# Patient Record
Sex: FEMALE | Race: ASIAN | NOT HISPANIC OR LATINO
[De-identification: names, ages, dates, MRNs, and addresses within clinical notes are randomized per-mention and may not be internally consistent; named-entity substitution may affect disease eponyms.]

---

## 2021-11-03 ENCOUNTER — NON-APPOINTMENT (OUTPATIENT)
Age: 26
End: 2021-11-03

## 2021-11-03 ENCOUNTER — APPOINTMENT (OUTPATIENT)
Dept: OBGYN | Facility: HOSPITAL | Age: 26
End: 2021-11-03

## 2021-11-03 ENCOUNTER — OUTPATIENT (OUTPATIENT)
Dept: OUTPATIENT SERVICES | Facility: HOSPITAL | Age: 26
LOS: 1 days | End: 2021-11-03
Payer: MEDICAID

## 2021-11-03 VITALS
HEIGHT: 65 IN | BODY MASS INDEX: 42.15 KG/M2 | WEIGHT: 253 LBS | HEART RATE: 90 BPM | DIASTOLIC BLOOD PRESSURE: 80 MMHG | SYSTOLIC BLOOD PRESSURE: 130 MMHG

## 2021-11-03 DIAGNOSIS — Z34.90 ENCOUNTER FOR SUPERVISION OF NORMAL PREGNANCY, UNSPECIFIED, UNSPECIFIED TRIMESTER: ICD-10-CM

## 2021-11-03 DIAGNOSIS — Z83.3 FAMILY HISTORY OF DIABETES MELLITUS: ICD-10-CM

## 2021-11-03 DIAGNOSIS — E66.9 OBESITY, UNSPECIFIED: ICD-10-CM

## 2021-11-03 DIAGNOSIS — Z87.42 PERSONAL HISTORY OF OTHER DISEASES OF THE FEMALE GENITAL TRACT: ICD-10-CM

## 2021-11-03 DIAGNOSIS — Z00.00 ENCOUNTER FOR GENERAL ADULT MEDICAL EXAMINATION W/OUT ABNORMAL FINDINGS: ICD-10-CM

## 2021-11-03 DIAGNOSIS — Z00.00 ENCOUNTER FOR GENERAL ADULT MEDICAL EXAMINATION WITHOUT ABNORMAL FINDINGS: ICD-10-CM

## 2021-11-03 DIAGNOSIS — Z34.91 ENCOUNTER FOR SUPERVISION OF NORMAL PREGNANCY, UNSPECIFIED, FIRST TRIMESTER: ICD-10-CM

## 2021-11-03 RX ORDER — VITAMIN A ACETATE, .BETA.-CAROTENE, ASCORBIC ACID, CHOLECALCIFEROL, .ALPHA.-TOCOPHEROL ACETATE, DL-, THIAMINE MONONITRATE, RIBOFLAVIN, NIACINAMIDE, PYRIDOXINE HYDROCHLORIDE, FOLIC ACID, CYANOCOBALAMIN, CALCIUM CARBONATE, FERROUS FUMARATE, ZINC OXIDE, AND CUPRIC OXIDE 2000; 2000; 120; 400; 22; 1.84; 3; 20; 10; 1; 12; 200; 27; 25; 2 [IU]/1; [IU]/1; MG/1; [IU]/1; MG/1; MG/1; MG/1; MG/1; MG/1; MG/1; UG/1; MG/1; MG/1; MG/1; MG/1
TABLET ORAL
Refills: 0 | Status: ACTIVE | COMMUNITY

## 2021-11-08 ENCOUNTER — RESULT REVIEW (OUTPATIENT)
Age: 26
End: 2021-11-08

## 2021-11-08 ENCOUNTER — ASOB RESULT (OUTPATIENT)
Age: 26
End: 2021-11-08

## 2021-11-08 ENCOUNTER — APPOINTMENT (OUTPATIENT)
Dept: ANTEPARTUM | Facility: CLINIC | Age: 26
End: 2021-11-08
Payer: MEDICAID

## 2021-11-08 LAB
24R-OH-CALCIDIOL SERPL-MCNC: 15.2 NG/ML — LOW (ref 30–80)
A1C WITH ESTIMATED AVERAGE GLUCOSE RESULT: 5.4 % — SIGNIFICANT CHANGE UP (ref 4–5.6)
ALBUMIN SERPL ELPH-MCNC: 4.3 G/DL — SIGNIFICANT CHANGE UP (ref 3.3–5)
ALP SERPL-CCNC: 80 U/L — SIGNIFICANT CHANGE UP (ref 40–120)
ALT FLD-CCNC: 19 U/L — SIGNIFICANT CHANGE UP (ref 4–33)
ANION GAP SERPL CALC-SCNC: 10 MMOL/L — SIGNIFICANT CHANGE UP (ref 7–14)
AST SERPL-CCNC: 17 U/L — SIGNIFICANT CHANGE UP (ref 4–32)
BASOPHILS # BLD AUTO: 0.05 K/UL — SIGNIFICANT CHANGE UP (ref 0–0.2)
BASOPHILS NFR BLD AUTO: 0.7 % — SIGNIFICANT CHANGE UP (ref 0–2)
BILIRUB SERPL-MCNC: 0.2 MG/DL — SIGNIFICANT CHANGE UP (ref 0.2–1.2)
BUN SERPL-MCNC: 6 MG/DL — LOW (ref 7–23)
CALCIUM SERPL-MCNC: 9.6 MG/DL — SIGNIFICANT CHANGE UP (ref 8.4–10.5)
CHLORIDE SERPL-SCNC: 104 MMOL/L — SIGNIFICANT CHANGE UP (ref 98–107)
CO2 SERPL-SCNC: 23 MMOL/L — SIGNIFICANT CHANGE UP (ref 22–31)
CREAT SERPL-MCNC: 0.49 MG/DL — LOW (ref 0.5–1.3)
EOSINOPHIL # BLD AUTO: 0.14 K/UL — SIGNIFICANT CHANGE UP (ref 0–0.5)
EOSINOPHIL NFR BLD AUTO: 1.8 % — SIGNIFICANT CHANGE UP (ref 0–6)
ESTIMATED AVERAGE GLUCOSE: 108 — SIGNIFICANT CHANGE UP
GLUCOSE SERPL-MCNC: 119 MG/DL — HIGH (ref 70–99)
HBV SURFACE AG SER-ACNC: SIGNIFICANT CHANGE UP
HCT VFR BLD CALC: 36.1 % — SIGNIFICANT CHANGE UP (ref 34.5–45)
HCV AB S/CO SERPL IA: 0.23 S/CO — SIGNIFICANT CHANGE UP (ref 0–0.99)
HCV AB SERPL-IMP: SIGNIFICANT CHANGE UP
HEMOGLOBIN INTERPRETATION: SIGNIFICANT CHANGE UP
HGB A MFR BLD: 97.3 % — SIGNIFICANT CHANGE UP
HGB A2 MFR BLD: 2.6 % — SIGNIFICANT CHANGE UP (ref 2.4–3.5)
HGB BLD-MCNC: 10.6 G/DL — LOW (ref 11.5–15.5)
HGB F MFR BLD: <1 % — SIGNIFICANT CHANGE UP (ref 0–1.5)
HIV 1+2 AB+HIV1 P24 AG SERPL QL IA: SIGNIFICANT CHANGE UP
IANC: 4.96 K/UL — SIGNIFICANT CHANGE UP (ref 1.5–8.5)
IMM GRANULOCYTES NFR BLD AUTO: 0.1 % — SIGNIFICANT CHANGE UP (ref 0–1.5)
LYMPHOCYTES # BLD AUTO: 1.89 K/UL — SIGNIFICANT CHANGE UP (ref 1–3.3)
LYMPHOCYTES # BLD AUTO: 24.9 % — SIGNIFICANT CHANGE UP (ref 13–44)
MCHC RBC-ENTMCNC: 20.8 PG — LOW (ref 27–34)
MCHC RBC-ENTMCNC: 29.4 GM/DL — LOW (ref 32–36)
MCV RBC AUTO: 70.8 FL — LOW (ref 80–100)
MEV IGG SER-ACNC: >300 AU/ML — SIGNIFICANT CHANGE UP
MEV IGG+IGM SER-IMP: POSITIVE — SIGNIFICANT CHANGE UP
MONOCYTES # BLD AUTO: 0.55 K/UL — SIGNIFICANT CHANGE UP (ref 0–0.9)
MONOCYTES NFR BLD AUTO: 7.2 % — SIGNIFICANT CHANGE UP (ref 2–14)
NEUTROPHILS # BLD AUTO: 4.96 K/UL — SIGNIFICANT CHANGE UP (ref 1.8–7.4)
NEUTROPHILS NFR BLD AUTO: 65.3 % — SIGNIFICANT CHANGE UP (ref 43–77)
NRBC # BLD: 0 /100 WBCS — SIGNIFICANT CHANGE UP
NRBC # FLD: 0 K/UL — SIGNIFICANT CHANGE UP
PLATELET # BLD AUTO: 422 K/UL — HIGH (ref 150–400)
POTASSIUM SERPL-MCNC: 3.5 MMOL/L — SIGNIFICANT CHANGE UP (ref 3.5–5.3)
POTASSIUM SERPL-SCNC: 3.5 MMOL/L — SIGNIFICANT CHANGE UP (ref 3.5–5.3)
PROT SERPL-MCNC: 7.6 G/DL — SIGNIFICANT CHANGE UP (ref 6–8.3)
RBC # BLD: 5.1 M/UL — SIGNIFICANT CHANGE UP (ref 3.8–5.2)
RBC # FLD: 18 % — HIGH (ref 10.3–14.5)
RUBV IGG SER-ACNC: 2.5 INDEX — SIGNIFICANT CHANGE UP
RUBV IGG SER-IMP: POSITIVE — SIGNIFICANT CHANGE UP
SODIUM SERPL-SCNC: 137 MMOL/L — SIGNIFICANT CHANGE UP (ref 135–145)
T PALLIDUM AB TITR SER: NEGATIVE — SIGNIFICANT CHANGE UP
T4 FREE SERPL-MCNC: 1.2 NG/DL — SIGNIFICANT CHANGE UP (ref 0.9–1.8)
TSH SERPL-MCNC: 1.61 UIU/ML — SIGNIFICANT CHANGE UP (ref 0.27–4.2)
URATE SERPL-MCNC: 3.8 MG/DL — SIGNIFICANT CHANGE UP (ref 2.5–7)
VZV IGG FLD QL IA: 34.6 INDEX — SIGNIFICANT CHANGE UP
VZV IGG FLD QL IA: NEGATIVE — SIGNIFICANT CHANGE UP
WBC # BLD: 7.6 K/UL — SIGNIFICANT CHANGE UP (ref 3.8–10.5)
WBC # FLD AUTO: 7.6 K/UL — SIGNIFICANT CHANGE UP (ref 3.8–10.5)

## 2021-11-08 PROCEDURE — 76815 OB US LIMITED FETUS(S): CPT | Mod: 26

## 2021-11-08 PROCEDURE — 83020 HEMOGLOBIN ELECTROPHORESIS: CPT | Mod: 26

## 2021-11-09 LAB
GAMMA INTERFERON BACKGROUND BLD IA-ACNC: 0.03 IU/ML — SIGNIFICANT CHANGE UP
LEAD BLD-MCNC: <1 UG/DL — SIGNIFICANT CHANGE UP (ref 0–4)
M TB IFN-G BLD-IMP: NEGATIVE — SIGNIFICANT CHANGE UP
M TB IFN-G CD4+ BCKGRND COR BLD-ACNC: 0 IU/ML — SIGNIFICANT CHANGE UP
M TB IFN-G CD4+CD8+ BCKGRND COR BLD-ACNC: 0.01 IU/ML — SIGNIFICANT CHANGE UP
QUANT TB PLUS MITOGEN MINUS NIL: 7.07 IU/ML — SIGNIFICANT CHANGE UP

## 2021-11-09 RX ORDER — CHLORHEXIDINE GLUCONATE 4 %
325 (65 FE) LIQUID (ML) TOPICAL TWICE DAILY
Qty: 30 | Refills: 5 | Status: ACTIVE | COMMUNITY
Start: 2021-11-09 | End: 1900-01-01

## 2021-11-12 LAB — SMA INTERPRETATION: SIGNIFICANT CHANGE UP

## 2021-11-15 LAB — CYSTIC FIBROSIS EXPANDED PANEL: SIGNIFICANT CHANGE UP

## 2021-11-23 ENCOUNTER — NON-APPOINTMENT (OUTPATIENT)
Age: 26
End: 2021-11-23

## 2021-11-24 ENCOUNTER — APPOINTMENT (OUTPATIENT)
Dept: OBGYN | Facility: HOSPITAL | Age: 26
End: 2021-11-24

## 2021-12-01 ENCOUNTER — NON-APPOINTMENT (OUTPATIENT)
Age: 26
End: 2021-12-01

## 2021-12-02 ENCOUNTER — NON-APPOINTMENT (OUTPATIENT)
Age: 26
End: 2021-12-02

## 2021-12-02 ENCOUNTER — RESULT REVIEW (OUTPATIENT)
Age: 26
End: 2021-12-02

## 2021-12-02 ENCOUNTER — ASOB RESULT (OUTPATIENT)
Age: 26
End: 2021-12-02

## 2021-12-02 ENCOUNTER — APPOINTMENT (OUTPATIENT)
Dept: ANTEPARTUM | Facility: CLINIC | Age: 26
End: 2021-12-02
Payer: MEDICAID

## 2021-12-02 ENCOUNTER — APPOINTMENT (OUTPATIENT)
Dept: OBGYN | Facility: HOSPITAL | Age: 26
End: 2021-12-02

## 2021-12-02 ENCOUNTER — OUTPATIENT (OUTPATIENT)
Dept: OUTPATIENT SERVICES | Facility: HOSPITAL | Age: 26
LOS: 1 days | End: 2021-12-02

## 2021-12-02 VITALS
WEIGHT: 251 LBS | RESPIRATION RATE: 20 BRPM | SYSTOLIC BLOOD PRESSURE: 121 MMHG | HEART RATE: 102 BPM | TEMPERATURE: 98.1 F | DIASTOLIC BLOOD PRESSURE: 80 MMHG

## 2021-12-02 DIAGNOSIS — E55.9 VITAMIN D DEFICIENCY, UNSPECIFIED: ICD-10-CM

## 2021-12-02 DIAGNOSIS — E66.9 OBESITY, UNSPECIFIED: ICD-10-CM

## 2021-12-02 DIAGNOSIS — O26.899 OTHER SPECIFIED PREGNANCY RELATED CONDITIONS, UNSPECIFIED TRIMESTER: ICD-10-CM

## 2021-12-02 DIAGNOSIS — Z34.91 ENCOUNTER FOR SUPERVISION OF NORMAL PREGNANCY, UNSPECIFIED, FIRST TRIMESTER: ICD-10-CM

## 2021-12-02 PROCEDURE — 36416 COLLJ CAPILLARY BLOOD SPEC: CPT

## 2021-12-02 PROCEDURE — 76801 OB US < 14 WKS SINGLE FETUS: CPT | Mod: 26

## 2021-12-02 PROCEDURE — 76813 OB US NUCHAL MEAS 1 GEST: CPT | Mod: 26

## 2021-12-03 LAB
C TRACH RRNA SPEC QL NAA+PROBE: SIGNIFICANT CHANGE UP
N GONORRHOEA RRNA SPEC QL NAA+PROBE: SIGNIFICANT CHANGE UP
SPECIMEN SOURCE: SIGNIFICANT CHANGE UP

## 2021-12-04 LAB
CULTURE RESULTS: SIGNIFICANT CHANGE UP
SPECIMEN SOURCE: SIGNIFICANT CHANGE UP

## 2021-12-06 ENCOUNTER — RESULT REVIEW (OUTPATIENT)
Age: 26
End: 2021-12-06

## 2021-12-09 LAB
1ST TRIMESTER DATA: SIGNIFICANT CHANGE UP
ADDENDUM DOC: SIGNIFICANT CHANGE UP
AFP SERPL-ACNC: SIGNIFICANT CHANGE UP
B-HCG FREE SERPL-MCNC: SIGNIFICANT CHANGE UP
CLINICAL BIOCHEMIST REVIEW: SIGNIFICANT CHANGE UP
CLINICAL BIOCHEMIST REVIEW: SIGNIFICANT CHANGE UP
DEMOGRAPHIC DATA: SIGNIFICANT CHANGE UP
NASAL BONE: PRESENT — SIGNIFICANT CHANGE UP
NT: SIGNIFICANT CHANGE UP
PAPP-A SERPL-ACNC: SIGNIFICANT CHANGE UP
SCREEN-FOOTER: SIGNIFICANT CHANGE UP
SCREEN-RECOMMENDATIONS: SIGNIFICANT CHANGE UP

## 2021-12-16 ENCOUNTER — NON-APPOINTMENT (OUTPATIENT)
Age: 26
End: 2021-12-16

## 2021-12-29 ENCOUNTER — NON-APPOINTMENT (OUTPATIENT)
Age: 26
End: 2021-12-29

## 2022-01-13 ENCOUNTER — APPOINTMENT (OUTPATIENT)
Dept: OBGYN | Facility: HOSPITAL | Age: 27
End: 2022-01-13

## 2022-01-26 ENCOUNTER — NON-APPOINTMENT (OUTPATIENT)
Age: 27
End: 2022-01-26

## 2022-01-27 ENCOUNTER — APPOINTMENT (OUTPATIENT)
Dept: OBGYN | Facility: HOSPITAL | Age: 27
End: 2022-01-27

## 2022-01-31 ENCOUNTER — APPOINTMENT (OUTPATIENT)
Dept: ANTEPARTUM | Facility: CLINIC | Age: 27
End: 2022-01-31

## 2022-01-31 ENCOUNTER — NON-APPOINTMENT (OUTPATIENT)
Age: 27
End: 2022-01-31

## 2022-01-31 ENCOUNTER — APPOINTMENT (OUTPATIENT)
Dept: OBGYN | Facility: HOSPITAL | Age: 27
End: 2022-01-31

## 2022-02-08 ENCOUNTER — NON-APPOINTMENT (OUTPATIENT)
Age: 27
End: 2022-02-08

## 2022-02-09 ENCOUNTER — APPOINTMENT (OUTPATIENT)
Dept: OBGYN | Facility: HOSPITAL | Age: 27
End: 2022-02-09

## 2022-02-13 ENCOUNTER — OUTPATIENT (OUTPATIENT)
Dept: INPATIENT UNIT | Facility: HOSPITAL | Age: 27
LOS: 1 days | Discharge: HOME | End: 2022-02-13

## 2022-02-13 VITALS
DIASTOLIC BLOOD PRESSURE: 72 MMHG | TEMPERATURE: 98 F | HEART RATE: 90 BPM | SYSTOLIC BLOOD PRESSURE: 121 MMHG | RESPIRATION RATE: 16 BRPM

## 2022-02-13 VITALS — HEART RATE: 87 BPM | SYSTOLIC BLOOD PRESSURE: 129 MMHG | DIASTOLIC BLOOD PRESSURE: 67 MMHG

## 2022-02-13 DIAGNOSIS — Z3A.00 WEEKS OF GESTATION OF PREGNANCY NOT SPECIFIED: ICD-10-CM

## 2022-02-13 DIAGNOSIS — O26.899 OTHER SPECIFIED PREGNANCY RELATED CONDITIONS, UNSPECIFIED TRIMESTER: ICD-10-CM

## 2022-02-13 LAB
APPEARANCE UR: ABNORMAL
BACTERIA # UR AUTO: ABNORMAL
BILIRUB UR-MCNC: NEGATIVE — SIGNIFICANT CHANGE UP
COLOR SPEC: YELLOW — SIGNIFICANT CHANGE UP
COMMENT - URINE: SIGNIFICANT CHANGE UP
DIFF PNL FLD: NEGATIVE — SIGNIFICANT CHANGE UP
EPI CELLS # UR: 5 /HPF — SIGNIFICANT CHANGE UP (ref 0–5)
GLUCOSE UR QL: NEGATIVE — SIGNIFICANT CHANGE UP
HYALINE CASTS # UR AUTO: 3 /LPF — SIGNIFICANT CHANGE UP (ref 0–7)
KETONES UR-MCNC: ABNORMAL
LEUKOCYTE ESTERASE UR-ACNC: ABNORMAL
NITRITE UR-MCNC: NEGATIVE — SIGNIFICANT CHANGE UP
PH UR: 6 — SIGNIFICANT CHANGE UP (ref 5–8)
PROT UR-MCNC: ABNORMAL
RBC CASTS # UR COMP ASSIST: 1 /HPF — SIGNIFICANT CHANGE UP (ref 0–4)
SP GR SPEC: 1.03 — SIGNIFICANT CHANGE UP (ref 1–1.05)
UROBILINOGEN FLD QL: ABNORMAL
WBC UR QL: 5 /HPF — SIGNIFICANT CHANGE UP (ref 0–5)

## 2022-02-13 NOTE — OB PROVIDER TRIAGE NOTE - HISTORY OF PRESENT ILLNESS
This is a 26 year old PCAP  at 23.5 weeks gestational age presents with complaints of muscle spasm on left lower back that radiates down her buttocks. Pt states that her pain started 48 hours ago after bending down. Pt states she has tried tylenol 500mg at home with minimal relief.   denies LOF, VB or contractions. Pt denies any abdominal pain.  reports +GFM  AP course uncomplicated     med: denies  surg: denies  gyn: denies  ob: denies  current meds: pnv  allergy- latex- rash

## 2022-02-13 NOTE — OB PROVIDER TRIAGE NOTE - NSOBPROVIDERNOTE_OBGYN_ALL_OB_FT
This is a 26 year old  at 23.5 weeks gestational age presents with left lower back muscle spasm    plan discussed with dr plascencia  maternal/fetal surveillance reassuring  no OB complaints  recommendation apply heat packs ice packs to affected area, tylenol 1000 mg q 6 hours for pain, massage, chiropractor, stretching exercises  follow up in pcap clinic tomorrow as scheduled   pt verbalized understanding of instructions given  discharged home

## 2022-02-13 NOTE — OB PROVIDER TRIAGE NOTE - NSHPPHYSICALEXAM_GEN_ALL_CORE
Vital Signs Last 24 Hrs  T(C): 36.7 (13 Feb 2022 16:39), Max: 36.7 (13 Feb 2022 16:39)  T(F): 98.1 (13 Feb 2022 16:39), Max: 98.1 (13 Feb 2022 16:39)  HR: 87 (13 Feb 2022 18:18) (87 - 93)  BP: 129/67 (13 Feb 2022 18:18) (121/72 - 131/59)  BP(mean): --  RR: 16 (13 Feb 2022 16:39) (16 - 16)  SpO2: --    A&O x3  CTAB  abdomen: gravid, soft, nontender  back: no CVA tenderness, Left lower back / buttocks tender on touch, tight muscle  palpated  TAS: breech, posterior placenta, + GFM, mvp 7.78, transabdominal CL 3.50  TVS: pt unable to tolerate TVS due to lower back pain and unable to lie flat or place legs in stirrups

## 2022-02-13 NOTE — OB RN TRIAGE NOTE - FALL HARM RISK - UNIVERSAL INTERVENTIONS
Bed in lowest position, wheels locked, appropriate side rails in place/Call bell, personal items and telephone in reach/Instruct patient to call for assistance before getting out of bed or chair/Non-slip footwear when patient is out of bed/[place] to call system/Physically safe environment - no spills, clutter or unnecessary equipment/Purposeful Proactive Rounding/Room/bathroom lighting operational, light cord in reach

## 2022-02-13 NOTE — OB RN TRIAGE NOTE - NSNURSINGINSTR_OBGYN_ALL_OB_FT
Called.  L/m on v/m to call back to schedule f/u today at [facility] d/c instructions given by Teresa MANUEL

## 2022-02-14 ENCOUNTER — APPOINTMENT (OUTPATIENT)
Dept: OBGYN | Facility: HOSPITAL | Age: 27
End: 2022-02-14

## 2022-02-14 ENCOUNTER — APPOINTMENT (OUTPATIENT)
Dept: ANTEPARTUM | Facility: CLINIC | Age: 27
End: 2022-02-14
Payer: MEDICAID

## 2022-02-14 ENCOUNTER — OUTPATIENT (OUTPATIENT)
Dept: OUTPATIENT SERVICES | Facility: HOSPITAL | Age: 27
LOS: 1 days | End: 2022-02-14

## 2022-02-14 ENCOUNTER — ASOB RESULT (OUTPATIENT)
Age: 27
End: 2022-02-14

## 2022-02-14 ENCOUNTER — TRANSCRIPTION ENCOUNTER (OUTPATIENT)
Age: 27
End: 2022-02-14

## 2022-02-14 VITALS
HEART RATE: 94 BPM | SYSTOLIC BLOOD PRESSURE: 121 MMHG | TEMPERATURE: 97 F | RESPIRATION RATE: 20 BRPM | DIASTOLIC BLOOD PRESSURE: 71 MMHG | WEIGHT: 251 LBS

## 2022-02-14 DIAGNOSIS — Z34.91 ENCOUNTER FOR SUPERVISION OF NORMAL PREGNANCY, UNSPECIFIED, FIRST TRIMESTER: ICD-10-CM

## 2022-02-14 DIAGNOSIS — E66.9 OBESITY, UNSPECIFIED: ICD-10-CM

## 2022-02-14 PROCEDURE — 76805 OB US >/= 14 WKS SNGL FETUS: CPT | Mod: 26

## 2022-02-15 DIAGNOSIS — O26.899 OTHER SPECIFIED PREGNANCY RELATED CONDITIONS, UNSPECIFIED TRIMESTER: ICD-10-CM

## 2022-02-15 DIAGNOSIS — Z34.92 ENCOUNTER FOR SUPERVISION OF NORMAL PREGNANCY, UNSPECIFIED, SECOND TRIMESTER: ICD-10-CM

## 2022-02-15 DIAGNOSIS — F41.9 ANXIETY DISORDER, UNSPECIFIED: ICD-10-CM

## 2022-02-15 DIAGNOSIS — E66.9 OBESITY, UNSPECIFIED: ICD-10-CM

## 2022-03-03 ENCOUNTER — ASOB RESULT (OUTPATIENT)
Age: 27
End: 2022-03-03

## 2022-03-03 ENCOUNTER — APPOINTMENT (OUTPATIENT)
Dept: ANTEPARTUM | Facility: CLINIC | Age: 27
End: 2022-03-03
Payer: MEDICAID

## 2022-03-03 PROCEDURE — 76816 OB US FOLLOW-UP PER FETUS: CPT

## 2022-03-14 ENCOUNTER — NON-APPOINTMENT (OUTPATIENT)
Age: 27
End: 2022-03-14

## 2022-03-15 ENCOUNTER — RESULT REVIEW (OUTPATIENT)
Age: 27
End: 2022-03-15

## 2022-03-15 ENCOUNTER — APPOINTMENT (OUTPATIENT)
Dept: OBGYN | Facility: HOSPITAL | Age: 27
End: 2022-03-15

## 2022-03-15 ENCOUNTER — OUTPATIENT (OUTPATIENT)
Dept: OUTPATIENT SERVICES | Facility: HOSPITAL | Age: 27
LOS: 1 days | End: 2022-03-15

## 2022-03-15 VITALS
HEART RATE: 107 BPM | DIASTOLIC BLOOD PRESSURE: 59 MMHG | BODY MASS INDEX: 42.49 KG/M2 | WEIGHT: 255 LBS | SYSTOLIC BLOOD PRESSURE: 114 MMHG | HEIGHT: 65 IN | TEMPERATURE: 97.3 F

## 2022-03-15 DIAGNOSIS — Z34.92 ENCOUNTER FOR SUPERVISION OF NORMAL PREGNANCY, UNSPECIFIED, SECOND TRIMESTER: ICD-10-CM

## 2022-03-15 LAB
24R-OH-CALCIDIOL SERPL-MCNC: 26 NG/ML — LOW (ref 30–80)
BASOPHILS # BLD AUTO: 0.02 K/UL — SIGNIFICANT CHANGE UP (ref 0–0.2)
BASOPHILS NFR BLD AUTO: 0.2 % — SIGNIFICANT CHANGE UP (ref 0–2)
EOSINOPHIL # BLD AUTO: 0.05 K/UL — SIGNIFICANT CHANGE UP (ref 0–0.5)
EOSINOPHIL NFR BLD AUTO: 0.6 % — SIGNIFICANT CHANGE UP (ref 0–6)
GLUCOSE 1H P MEAL SERPL-MCNC: 143 MG/DL — HIGH (ref 70–134)
HCT VFR BLD CALC: 35.1 % — SIGNIFICANT CHANGE UP (ref 34.5–45)
HGB BLD-MCNC: 10.7 G/DL — LOW (ref 11.5–15.5)
IANC: 6.25 K/UL — SIGNIFICANT CHANGE UP (ref 1.5–8.5)
IMM GRANULOCYTES NFR BLD AUTO: 0.5 % — SIGNIFICANT CHANGE UP (ref 0–1.5)
LYMPHOCYTES # BLD AUTO: 1.24 K/UL — SIGNIFICANT CHANGE UP (ref 1–3.3)
LYMPHOCYTES # BLD AUTO: 15.4 % — SIGNIFICANT CHANGE UP (ref 13–44)
MCHC RBC-ENTMCNC: 22.7 PG — LOW (ref 27–34)
MCHC RBC-ENTMCNC: 30.5 GM/DL — LOW (ref 32–36)
MCV RBC AUTO: 74.4 FL — LOW (ref 80–100)
MONOCYTES # BLD AUTO: 0.44 K/UL — SIGNIFICANT CHANGE UP (ref 0–0.9)
MONOCYTES NFR BLD AUTO: 5.5 % — SIGNIFICANT CHANGE UP (ref 2–14)
NEUTROPHILS # BLD AUTO: 6.25 K/UL — SIGNIFICANT CHANGE UP (ref 1.8–7.4)
NEUTROPHILS NFR BLD AUTO: 77.8 % — HIGH (ref 43–77)
NRBC # BLD: 0 /100 WBCS — SIGNIFICANT CHANGE UP
NRBC # FLD: 0 K/UL — SIGNIFICANT CHANGE UP
PLATELET # BLD AUTO: 364 K/UL — SIGNIFICANT CHANGE UP (ref 150–400)
RBC # BLD: 4.72 M/UL — SIGNIFICANT CHANGE UP (ref 3.8–5.2)
RBC # FLD: 16.8 % — HIGH (ref 10.3–14.5)
WBC # BLD: 8.04 K/UL — SIGNIFICANT CHANGE UP (ref 3.8–10.5)
WBC # FLD AUTO: 8.04 K/UL — SIGNIFICANT CHANGE UP (ref 3.8–10.5)

## 2022-03-15 RX ORDER — HUMAN RHO(D) IMMUNE GLOBULIN 300 UG/1
1500 INJECTION, SOLUTION INTRAMUSCULAR
Qty: 0 | Refills: 0 | Status: COMPLETED | OUTPATIENT
Start: 2022-03-15

## 2022-03-15 RX ADMIN — HUMAN RHO(D) IMMUNE GLOBULIN 0 UNIT: 300 INJECTION, SOLUTION INTRAMUSCULAR at 00:00

## 2022-03-16 DIAGNOSIS — O99.019 ANEMIA COMPLICATING PREGNANCY, UNSPECIFIED TRIMESTER: ICD-10-CM

## 2022-03-16 DIAGNOSIS — Z67.91 UNSPECIFIED BLOOD TYPE, RH NEGATIVE: ICD-10-CM

## 2022-03-16 LAB — T PALLIDUM AB TITR SER: NEGATIVE — SIGNIFICANT CHANGE UP

## 2022-03-17 ENCOUNTER — ASOB RESULT (OUTPATIENT)
Age: 27
End: 2022-03-17

## 2022-03-17 ENCOUNTER — APPOINTMENT (OUTPATIENT)
Dept: ANTEPARTUM | Facility: CLINIC | Age: 27
End: 2022-03-17
Payer: MEDICAID

## 2022-03-17 PROCEDURE — 76819 FETAL BIOPHYS PROFIL W/O NST: CPT | Mod: 26

## 2022-03-17 PROCEDURE — 76816 OB US FOLLOW-UP PER FETUS: CPT | Mod: 26

## 2022-03-22 ENCOUNTER — RESULT REVIEW (OUTPATIENT)
Age: 27
End: 2022-03-22

## 2022-03-22 ENCOUNTER — OUTPATIENT (OUTPATIENT)
Dept: OUTPATIENT SERVICES | Facility: HOSPITAL | Age: 27
LOS: 1 days | End: 2022-03-22

## 2022-03-22 ENCOUNTER — APPOINTMENT (OUTPATIENT)
Dept: OBGYN | Facility: HOSPITAL | Age: 27
End: 2022-03-22

## 2022-03-22 ENCOUNTER — NON-APPOINTMENT (OUTPATIENT)
Age: 27
End: 2022-03-22

## 2022-03-22 DIAGNOSIS — Z34.90 ENCOUNTER FOR SUPERVISION OF NORMAL PREGNANCY, UNSPECIFIED, UNSPECIFIED TRIMESTER: ICD-10-CM

## 2022-03-22 DIAGNOSIS — Z13.1 ENCOUNTER FOR SCREENING FOR DIABETES MELLITUS: ICD-10-CM

## 2022-03-22 LAB
GESTATIONAL GTT PNL UR+SERPL: 95 MG/DL — HIGH (ref 70–94)
GLUCOSE 1H P CHAL SERPL-MCNC: 191 MG/DL — HIGH (ref 70–179)
GTT GEST 2H PNL UR+SERPL: 151 MG/DL — SIGNIFICANT CHANGE UP (ref 70–154)
GTT GEST 3H PNL SERPL: 123 MG/DL — SIGNIFICANT CHANGE UP (ref 70–139)

## 2022-03-23 ENCOUNTER — NON-APPOINTMENT (OUTPATIENT)
Age: 27
End: 2022-03-23

## 2022-04-04 ENCOUNTER — NON-APPOINTMENT (OUTPATIENT)
Age: 27
End: 2022-04-04

## 2022-04-14 ENCOUNTER — OUTPATIENT (OUTPATIENT)
Dept: OUTPATIENT SERVICES | Facility: HOSPITAL | Age: 27
LOS: 1 days | End: 2022-04-14

## 2022-04-14 ENCOUNTER — APPOINTMENT (OUTPATIENT)
Dept: ANTEPARTUM | Facility: CLINIC | Age: 27
End: 2022-04-14
Payer: MEDICAID

## 2022-04-14 ENCOUNTER — ASOB RESULT (OUTPATIENT)
Age: 27
End: 2022-04-14

## 2022-04-14 ENCOUNTER — APPOINTMENT (OUTPATIENT)
Dept: OBGYN | Facility: HOSPITAL | Age: 27
End: 2022-04-14

## 2022-04-14 VITALS
TEMPERATURE: 97.2 F | SYSTOLIC BLOOD PRESSURE: 117 MMHG | RESPIRATION RATE: 20 BRPM | HEART RATE: 101 BPM | HEIGHT: 65 IN | DIASTOLIC BLOOD PRESSURE: 66 MMHG | WEIGHT: 258 LBS | BODY MASS INDEX: 42.99 KG/M2

## 2022-04-14 LAB — GLUCOSE BLDC GLUCOMTR-MCNC: 95

## 2022-04-14 PROCEDURE — 76819 FETAL BIOPHYS PROFIL W/O NST: CPT | Mod: 26

## 2022-04-14 PROCEDURE — 76816 OB US FOLLOW-UP PER FETUS: CPT | Mod: 26

## 2022-04-15 DIAGNOSIS — Z00.00 ENCOUNTER FOR GENERAL ADULT MEDICAL EXAMINATION WITHOUT ABNORMAL FINDINGS: ICD-10-CM

## 2022-04-15 DIAGNOSIS — O24.419 GESTATIONAL DIABETES MELLITUS IN PREGNANCY, UNSPECIFIED CONTROL: ICD-10-CM

## 2022-04-19 ENCOUNTER — NON-APPOINTMENT (OUTPATIENT)
Age: 27
End: 2022-04-19

## 2022-04-20 ENCOUNTER — APPOINTMENT (OUTPATIENT)
Dept: OBGYN | Facility: HOSPITAL | Age: 27
End: 2022-04-20

## 2022-04-27 ENCOUNTER — NON-APPOINTMENT (OUTPATIENT)
Age: 27
End: 2022-04-27

## 2022-05-06 ENCOUNTER — NON-APPOINTMENT (OUTPATIENT)
Age: 27
End: 2022-05-06

## 2022-05-11 ENCOUNTER — NON-APPOINTMENT (OUTPATIENT)
Age: 27
End: 2022-05-11

## 2022-05-17 ENCOUNTER — NON-APPOINTMENT (OUTPATIENT)
Age: 27
End: 2022-05-17

## 2022-05-18 ENCOUNTER — EMERGENCY (EMERGENCY)
Facility: HOSPITAL | Age: 27
LOS: 1 days | Discharge: HOME | End: 2022-05-18
Attending: EMERGENCY MEDICINE | Admitting: EMERGENCY MEDICINE
Payer: MEDICAID

## 2022-05-18 VITALS
DIASTOLIC BLOOD PRESSURE: 86 MMHG | RESPIRATION RATE: 18 BRPM | OXYGEN SATURATION: 100 % | HEART RATE: 68 BPM | SYSTOLIC BLOOD PRESSURE: 146 MMHG

## 2022-05-18 VITALS
DIASTOLIC BLOOD PRESSURE: 86 MMHG | OXYGEN SATURATION: 100 % | HEART RATE: 79 BPM | SYSTOLIC BLOOD PRESSURE: 115 MMHG | RESPIRATION RATE: 18 BRPM | TEMPERATURE: 98 F

## 2022-05-18 LAB

## 2022-05-18 PROCEDURE — 99284 EMERGENCY DEPT VISIT MOD MDM: CPT

## 2022-05-18 NOTE — ED PROVIDER NOTE - OBJECTIVE STATEMENT
26 yo F with no Past Medical History that is  37 wks pregnant by dates that is here for flu like symptoms x 5 days. Per pt, last thursday started having symptoms of cold such as runny nose, cough, bodyaches/chills. Saw her PMD 2 days after, was given promethazine and azithro which pt has been taking (has 2 more days of Azithro) and only takein promethazine at night which helps but concerned about taking too many meds given her pregnancy status. Her cough is phlegm with white/yellow discharge. No known sick contacts, no rashes, no chest pain. But tonight came in because she was SOB with cough and when in [place] coughed a lot of phlegm out and now feels improvement in breathing. Denies any urinary symptoms. non smoker. No travel, no leg edema.

## 2022-05-18 NOTE — ED ADULT NURSE NOTE - CHIEF COMPLAINT QUOTE
pt c/o coughing, SOB, chest pain x5 days. Pt 9 months pregnant, denies pregnancy complaints. Denies PMHx

## 2022-05-18 NOTE — ED PROVIDER NOTE - PATIENT PORTAL LINK FT
You can access the FollowMyHealth Patient Portal offered by [facility] by registering at the following website: [email or web address]. By joining [employer]’s FollowMyHealth portal, you will also be able to view your health information using other applications (apps) compatible with our system.

## 2022-05-18 NOTE — ED PROVIDER NOTE - NSFOLLOWUPINSTRUCTIONS_ED_ALL_ED_FT
You likely have a VIRAL SYNDROME/ILLNESS given your symptoms.   You were offered a chest XRAY here but refused due to your pregnancy.     In the meantime, we recommend symptomatic care such as taking tylenol 650 mg or 975 mg every 4-6 hours as needed for bodyaches/chills/fever or pain.     You should finish the medications AZITHROMYCIN that was given by your doctor even if you feel better.     You should also take the cough medication your doctor prescribed to you if you think it is helping you sleep.     You should stay hydrated with any type of fluid you can drink in the meantime, get rest when possible and follow up with your primary care doctor and your OBGYN until your delivery.     if your symptoms worsen to the point where you cannot drink/eat please return to the ED as needed.     You had a SWAB taken from your nose which will check for multiple common viruses including the flu and covid. We will text you the results. If positive, stay away from others as your are likely infectious.

## 2022-05-18 NOTE — ED PROVIDER NOTE - CLINICAL SUMMARY MEDICAL DECISION MAKING FREE TEXT BOX
28 yo F with no Past Medical History that is 37 wks pregnant that presents with viral syndrome x 5 days, already on day 3/5 azithro and taking promethazine PRN which helps. Here tonight because she had transient SOB tonight which cleared while in [place] when she coughed phlegm and cleared her throat. At this time saturating well, no fever and not tachy and not HTN in ED. Speaking full sentences, with clear lungs. Offered meds and CXR to pt but she refused given her pregnancy status and concern for baby health. Explained that likely her condition is viral in nature and that care should be symptomatic care with tylenol for bodyaches/chills, finish Azithro which she already has been taking and stay hydrated with PO fluids and f/u with PMD. At this time, will swab pt with RVP and discharge. Return precautions explained and understood.

## 2022-05-18 NOTE — ED ADULT TRIAGE NOTE - CHIEF COMPLAINT QUOTE
pt c/o coughing, SOB, chest pain x5 days. Denies PMHx pt c/o coughing, SOB, chest pain x5 days. Pt 9 months pregnant, denies pregnancy complaints. Denies PMHx

## 2022-05-18 NOTE — ED PROVIDER NOTE - IV ALTEPLASE EXCL REL HIDDEN
[place] website --- [email or web address]/[facility] for Tobacco Control website --- [email or web address]/quitsmoking show

## 2022-05-18 NOTE — ED PROVIDER NOTE - CARDIAC, MLM
I have personally seen and examined this patient.  I have fully participated in the care of this patient. I have reviewed all pertinent clinical information, including history, physical exam, plan and the Resident’s note and agree except as noted. Normal rate, regular rhythm.  Heart sounds S1, S2.  No murmurs, rubs or gallops.

## 2022-05-18 NOTE — ED ADULT NURSE NOTE - OBJECTIVE STATEMENT
Patient received in room 7, A/Ox4, ambulatory, c/o flu-like symptoms. Patient states she has had a cough, SOB and associated chest discomfort x 5 days. Patient states she is 9 months pregnant. Endorses worsening symptoms. Denies fever, chills, N/V and urinary symptoms. Respirations even and unlabored, sating 100% on RA. Placed on cardiac monitor, NSR. Bed placed in lowest position.

## 2022-05-19 ENCOUNTER — NON-APPOINTMENT (OUTPATIENT)
Age: 27
End: 2022-05-19

## 2022-05-19 ENCOUNTER — APPOINTMENT (OUTPATIENT)
Dept: ANTEPARTUM | Facility: CLINIC | Age: 27
End: 2022-05-19

## 2022-05-19 ENCOUNTER — APPOINTMENT (OUTPATIENT)
Dept: OBGYN | Facility: HOSPITAL | Age: 27
End: 2022-05-19

## 2022-05-20 ENCOUNTER — NON-APPOINTMENT (OUTPATIENT)
Age: 27
End: 2022-05-20

## 2022-05-23 ENCOUNTER — NON-APPOINTMENT (OUTPATIENT)
Age: 27
End: 2022-05-23

## 2022-05-24 ENCOUNTER — NON-APPOINTMENT (OUTPATIENT)
Age: 27
End: 2022-05-24

## 2022-05-25 ENCOUNTER — NON-APPOINTMENT (OUTPATIENT)
Age: 27
End: 2022-05-25

## 2022-05-26 ENCOUNTER — APPOINTMENT (OUTPATIENT)
Dept: ANTEPARTUM | Facility: CLINIC | Age: 27
End: 2022-05-26

## 2022-05-26 ENCOUNTER — RESULT REVIEW (OUTPATIENT)
Age: 27
End: 2022-05-26

## 2022-05-26 ENCOUNTER — ASOB RESULT (OUTPATIENT)
Age: 27
End: 2022-05-26

## 2022-05-26 ENCOUNTER — NON-APPOINTMENT (OUTPATIENT)
Age: 27
End: 2022-05-26

## 2022-05-26 ENCOUNTER — INPATIENT (INPATIENT)
Facility: HOSPITAL | Age: 27
LOS: 5 days | Discharge: HOME | End: 2022-06-01
Attending: SPECIALIST | Admitting: SPECIALIST
Payer: MEDICAID

## 2022-05-26 ENCOUNTER — APPOINTMENT (OUTPATIENT)
Dept: OBGYN | Facility: HOSPITAL | Age: 27
End: 2022-05-26

## 2022-05-26 ENCOUNTER — APPOINTMENT (OUTPATIENT)
Dept: ANTEPARTUM | Facility: CLINIC | Age: 27
End: 2022-05-26
Payer: MEDICAID

## 2022-05-26 ENCOUNTER — OUTPATIENT (OUTPATIENT)
Dept: OUTPATIENT SERVICES | Facility: HOSPITAL | Age: 27
LOS: 1 days | End: 2022-05-26

## 2022-05-26 VITALS
SYSTOLIC BLOOD PRESSURE: 125 MMHG | WEIGHT: 271 LBS | DIASTOLIC BLOOD PRESSURE: 83 MMHG | HEART RATE: 82 BPM | TEMPERATURE: 98.1 F | HEIGHT: 65 IN | BODY MASS INDEX: 45.15 KG/M2

## 2022-05-26 VITALS
SYSTOLIC BLOOD PRESSURE: 140 MMHG | RESPIRATION RATE: 18 BRPM | HEART RATE: 71 BPM | TEMPERATURE: 98 F | DIASTOLIC BLOOD PRESSURE: 77 MMHG

## 2022-05-26 DIAGNOSIS — Z34.83 ENCOUNTER FOR SUPERVISION OF OTHER NORMAL PREGNANCY, THIRD TRIMESTER: ICD-10-CM

## 2022-05-26 DIAGNOSIS — O14.93 UNSPECIFIED PRE-ECLAMPSIA, THIRD TRIMESTER: ICD-10-CM

## 2022-05-26 DIAGNOSIS — O14.90 UNSPECIFIED PRE-ECLAMPSIA, UNSPECIFIED TRIMESTER: ICD-10-CM

## 2022-05-26 DIAGNOSIS — O12.10 GESTATIONAL PROTEINURIA, UNSPECIFIED TRIMESTER: ICD-10-CM

## 2022-05-26 LAB
ALBUMIN SERPL ELPH-MCNC: 3.3 G/DL — SIGNIFICANT CHANGE UP (ref 3.3–5)
ALBUMIN SERPL ELPH-MCNC: 3.3 G/DL — SIGNIFICANT CHANGE UP (ref 3.3–5)
ALP SERPL-CCNC: 139 U/L — HIGH (ref 40–120)
ALP SERPL-CCNC: 145 U/L — HIGH (ref 40–120)
ALT FLD-CCNC: 6 U/L — SIGNIFICANT CHANGE UP (ref 4–33)
ALT FLD-CCNC: 7 U/L — SIGNIFICANT CHANGE UP (ref 4–33)
ANION GAP SERPL CALC-SCNC: 13 MMOL/L — SIGNIFICANT CHANGE UP (ref 7–14)
ANION GAP SERPL CALC-SCNC: 14 MMOL/L — SIGNIFICANT CHANGE UP (ref 7–14)
APPEARANCE UR: ABNORMAL
APTT BLD: 22.8 SEC — LOW (ref 27–36.3)
APTT BLD: 26.4 SEC — LOW (ref 27–36.3)
AST SERPL-CCNC: 12 U/L — SIGNIFICANT CHANGE UP (ref 4–32)
AST SERPL-CCNC: 20 U/L — SIGNIFICANT CHANGE UP (ref 4–32)
BACTERIA # UR AUTO: ABNORMAL
BASOPHILS # BLD AUTO: 0.02 K/UL — SIGNIFICANT CHANGE UP (ref 0–0.2)
BASOPHILS # BLD AUTO: 0.04 K/UL — SIGNIFICANT CHANGE UP (ref 0–0.2)
BASOPHILS NFR BLD AUTO: 0.2 % — SIGNIFICANT CHANGE UP (ref 0–2)
BASOPHILS NFR BLD AUTO: 0.5 % — SIGNIFICANT CHANGE UP (ref 0–2)
BILIRUB SERPL-MCNC: <0.2 MG/DL — SIGNIFICANT CHANGE UP (ref 0.2–1.2)
BILIRUB SERPL-MCNC: <0.2 MG/DL — SIGNIFICANT CHANGE UP (ref 0.2–1.2)
BILIRUB UR-MCNC: NEGATIVE — SIGNIFICANT CHANGE UP
BLD GP AB SCN SERPL QL: NEGATIVE — SIGNIFICANT CHANGE UP
BUN SERPL-MCNC: 5 MG/DL — LOW (ref 7–23)
BUN SERPL-MCNC: 6 MG/DL — LOW (ref 7–23)
CALCIUM SERPL-MCNC: 8.9 MG/DL — SIGNIFICANT CHANGE UP (ref 8.4–10.5)
CALCIUM SERPL-MCNC: 9.3 MG/DL — SIGNIFICANT CHANGE UP (ref 8.4–10.5)
CHLORIDE SERPL-SCNC: 104 MMOL/L — SIGNIFICANT CHANGE UP (ref 98–107)
CHLORIDE SERPL-SCNC: 104 MMOL/L — SIGNIFICANT CHANGE UP (ref 98–107)
CO2 SERPL-SCNC: 18 MMOL/L — LOW (ref 22–31)
CO2 SERPL-SCNC: 19 MMOL/L — LOW (ref 22–31)
COLOR SPEC: SIGNIFICANT CHANGE UP
COVID-19 SPIKE DOMAIN AB INTERP: POSITIVE
COVID-19 SPIKE DOMAIN ANTIBODY RESULT: >250 U/ML — HIGH
CREAT ?TM UR-MCNC: 455 MG/DL — SIGNIFICANT CHANGE UP
CREAT ?TM UR-MCNC: 54 MG/DL — SIGNIFICANT CHANGE UP
CREAT SERPL-MCNC: 0.5 MG/DL — SIGNIFICANT CHANGE UP (ref 0.5–1.3)
CREAT SERPL-MCNC: 0.54 MG/DL — SIGNIFICANT CHANGE UP (ref 0.5–1.3)
DIFF PNL FLD: NEGATIVE — SIGNIFICANT CHANGE UP
EGFR: 131 ML/MIN/1.73M2 — SIGNIFICANT CHANGE UP
EGFR: 133 ML/MIN/1.73M2 — SIGNIFICANT CHANGE UP
EOSINOPHIL # BLD AUTO: 0.02 K/UL — SIGNIFICANT CHANGE UP (ref 0–0.5)
EOSINOPHIL # BLD AUTO: 0.07 K/UL — SIGNIFICANT CHANGE UP (ref 0–0.5)
EOSINOPHIL NFR BLD AUTO: 0.2 % — SIGNIFICANT CHANGE UP (ref 0–6)
EOSINOPHIL NFR BLD AUTO: 0.9 % — SIGNIFICANT CHANGE UP (ref 0–6)
EPI CELLS # UR: 3 /HPF — SIGNIFICANT CHANGE UP (ref 0–5)
FIBRINOGEN PPP-MCNC: 920 MG/DL — HIGH (ref 330–520)
FIBRINOGEN PPP-MCNC: 941 MG/DL — HIGH (ref 330–520)
GLUCOSE BLDC GLUCOMTR-MCNC: 90
GLUCOSE SERPL-MCNC: 81 MG/DL — SIGNIFICANT CHANGE UP (ref 70–99)
GLUCOSE SERPL-MCNC: 83 MG/DL — SIGNIFICANT CHANGE UP (ref 70–99)
GLUCOSE UR QL: NEGATIVE — SIGNIFICANT CHANGE UP
HCT VFR BLD CALC: 30 % — LOW (ref 34.5–45)
HCT VFR BLD CALC: 31.3 % — LOW (ref 34.5–45)
HGB BLD-MCNC: 9.1 G/DL — LOW (ref 11.5–15.5)
HGB BLD-MCNC: 9.1 G/DL — LOW (ref 11.5–15.5)
HYALINE CASTS # UR AUTO: 4 /LPF — SIGNIFICANT CHANGE UP (ref 0–7)
IANC: 5.85 K/UL — SIGNIFICANT CHANGE UP (ref 1.8–7.4)
IANC: 6.99 K/UL — SIGNIFICANT CHANGE UP (ref 1.8–7.4)
IMM GRANULOCYTES NFR BLD AUTO: 0.6 % — SIGNIFICANT CHANGE UP (ref 0–1.5)
IMM GRANULOCYTES NFR BLD AUTO: 0.8 % — SIGNIFICANT CHANGE UP (ref 0–1.5)
INR BLD: 0.9 RATIO — SIGNIFICANT CHANGE UP (ref 0.88–1.16)
INR BLD: 0.91 RATIO — SIGNIFICANT CHANGE UP (ref 0.88–1.16)
KETONES UR-MCNC: NEGATIVE — SIGNIFICANT CHANGE UP
LDH SERPL L TO P-CCNC: 208 U/L — SIGNIFICANT CHANGE UP (ref 135–225)
LDH SERPL L TO P-CCNC: 306 U/L — HIGH (ref 135–225)
LEUKOCYTE ESTERASE UR-ACNC: ABNORMAL
LYMPHOCYTES # BLD AUTO: 1.26 K/UL — SIGNIFICANT CHANGE UP (ref 1–3.3)
LYMPHOCYTES # BLD AUTO: 1.26 K/UL — SIGNIFICANT CHANGE UP (ref 1–3.3)
LYMPHOCYTES # BLD AUTO: 14.2 % — SIGNIFICANT CHANGE UP (ref 13–44)
LYMPHOCYTES # BLD AUTO: 16 % — SIGNIFICANT CHANGE UP (ref 13–44)
MCHC RBC-ENTMCNC: 20.5 PG — LOW (ref 27–34)
MCHC RBC-ENTMCNC: 21.5 PG — LOW (ref 27–34)
MCHC RBC-ENTMCNC: 29.1 GM/DL — LOW (ref 32–36)
MCHC RBC-ENTMCNC: 30.3 GM/DL — LOW (ref 32–36)
MCV RBC AUTO: 70.5 FL — LOW (ref 80–100)
MCV RBC AUTO: 70.9 FL — LOW (ref 80–100)
MONOCYTES # BLD AUTO: 0.53 K/UL — SIGNIFICANT CHANGE UP (ref 0–0.9)
MONOCYTES # BLD AUTO: 0.59 K/UL — SIGNIFICANT CHANGE UP (ref 0–0.9)
MONOCYTES NFR BLD AUTO: 6 % — SIGNIFICANT CHANGE UP (ref 2–14)
MONOCYTES NFR BLD AUTO: 7.5 % — SIGNIFICANT CHANGE UP (ref 2–14)
NEUTROPHILS # BLD AUTO: 5.85 K/UL — SIGNIFICANT CHANGE UP (ref 1.8–7.4)
NEUTROPHILS # BLD AUTO: 6.99 K/UL — SIGNIFICANT CHANGE UP (ref 1.8–7.4)
NEUTROPHILS NFR BLD AUTO: 74.5 % — SIGNIFICANT CHANGE UP (ref 43–77)
NEUTROPHILS NFR BLD AUTO: 78.6 % — HIGH (ref 43–77)
NITRITE UR-MCNC: NEGATIVE — SIGNIFICANT CHANGE UP
NRBC # BLD: 0 /100 WBCS — SIGNIFICANT CHANGE UP
NRBC # BLD: 0 /100 WBCS — SIGNIFICANT CHANGE UP
NRBC # FLD: 0 K/UL — SIGNIFICANT CHANGE UP
NRBC # FLD: 0.02 K/UL — HIGH
PH UR: 6.5 — SIGNIFICANT CHANGE UP (ref 5–8)
PLATELET # BLD AUTO: 340 K/UL — SIGNIFICANT CHANGE UP (ref 150–400)
PLATELET # BLD AUTO: 363 K/UL — SIGNIFICANT CHANGE UP (ref 150–400)
POTASSIUM SERPL-MCNC: 4.1 MMOL/L — SIGNIFICANT CHANGE UP (ref 3.5–5.3)
POTASSIUM SERPL-MCNC: 4.2 MMOL/L — SIGNIFICANT CHANGE UP (ref 3.5–5.3)
POTASSIUM SERPL-SCNC: 4.1 MMOL/L — SIGNIFICANT CHANGE UP (ref 3.5–5.3)
POTASSIUM SERPL-SCNC: 4.2 MMOL/L — SIGNIFICANT CHANGE UP (ref 3.5–5.3)
PROT ?TM UR-MCNC: 19 MG/DL — SIGNIFICANT CHANGE UP
PROT ?TM UR-MCNC: 19 MG/DL — SIGNIFICANT CHANGE UP
PROT ?TM UR-MCNC: 78 MG/DL — SIGNIFICANT CHANGE UP
PROT SERPL-MCNC: 6.7 G/DL — SIGNIFICANT CHANGE UP (ref 6–8.3)
PROT SERPL-MCNC: 7.3 G/DL — SIGNIFICANT CHANGE UP (ref 6–8.3)
PROT UR-MCNC: ABNORMAL
PROT/CREAT UR-RTO: 0.2 RATIO — SIGNIFICANT CHANGE UP (ref 0–0.2)
PROT/CREAT UR-RTO: 0.4 RATIO — HIGH (ref 0–0.2)
PROTHROM AB SERPL-ACNC: 10.4 SEC — LOW (ref 10.5–13.4)
PROTHROM AB SERPL-ACNC: 10.5 SEC — SIGNIFICANT CHANGE UP (ref 10.5–13.4)
RBC # BLD: 4.23 M/UL — SIGNIFICANT CHANGE UP (ref 3.8–5.2)
RBC # BLD: 4.44 M/UL — SIGNIFICANT CHANGE UP (ref 3.8–5.2)
RBC # FLD: 15.9 % — HIGH (ref 10.3–14.5)
RBC # FLD: 16 % — HIGH (ref 10.3–14.5)
RBC CASTS # UR COMP ASSIST: 3 /HPF — SIGNIFICANT CHANGE UP (ref 0–4)
RH IG SCN BLD-IMP: NEGATIVE — SIGNIFICANT CHANGE UP
SARS-COV-2 IGG+IGM SERPL QL IA: >250 U/ML — HIGH
SARS-COV-2 IGG+IGM SERPL QL IA: POSITIVE
SODIUM SERPL-SCNC: 136 MMOL/L — SIGNIFICANT CHANGE UP (ref 135–145)
SODIUM SERPL-SCNC: 136 MMOL/L — SIGNIFICANT CHANGE UP (ref 135–145)
SP GR SPEC: 1.01 — SIGNIFICANT CHANGE UP (ref 1–1.05)
URATE SERPL-MCNC: 5.6 MG/DL — SIGNIFICANT CHANGE UP (ref 2.5–7)
URATE SERPL-MCNC: 5.9 MG/DL — SIGNIFICANT CHANGE UP (ref 2.5–7)
UROBILINOGEN FLD QL: SIGNIFICANT CHANGE UP
WBC # BLD: 7.86 K/UL — SIGNIFICANT CHANGE UP (ref 3.8–10.5)
WBC # BLD: 8.89 K/UL — SIGNIFICANT CHANGE UP (ref 3.8–10.5)
WBC # FLD AUTO: 7.86 K/UL — SIGNIFICANT CHANGE UP (ref 3.8–10.5)
WBC # FLD AUTO: 8.89 K/UL — SIGNIFICANT CHANGE UP (ref 3.8–10.5)
WBC UR QL: 23 /HPF — HIGH (ref 0–5)

## 2022-05-26 PROCEDURE — 76818 FETAL BIOPHYS PROFILE W/NST: CPT | Mod: 26

## 2022-05-26 PROCEDURE — 76816 OB US FOLLOW-UP PER FETUS: CPT | Mod: 26

## 2022-05-26 PROCEDURE — 99203 OFFICE O/P NEW LOW 30 MIN: CPT | Mod: 25

## 2022-05-26 RX ORDER — SODIUM CHLORIDE 9 MG/ML
1000 INJECTION, SOLUTION INTRAVENOUS
Refills: 0 | Status: DISCONTINUED | OUTPATIENT
Start: 2022-05-26 | End: 2022-05-26

## 2022-05-26 RX ORDER — MAGNESIUM SULFATE 500 MG/ML
2 VIAL (ML) INJECTION
Qty: 40 | Refills: 0 | Status: COMPLETED | OUTPATIENT
Start: 2022-05-26 | End: 2022-05-27

## 2022-05-26 RX ORDER — INFLUENZA VIRUS VACCINE 15; 15; 15; 15 UG/.5ML; UG/.5ML; UG/.5ML; UG/.5ML
0.5 SUSPENSION INTRAMUSCULAR ONCE
Refills: 0 | Status: COMPLETED | OUTPATIENT
Start: 2022-05-26 | End: 2022-05-26

## 2022-05-26 RX ORDER — OXYTOCIN 10 UNIT/ML
333.33 VIAL (ML) INJECTION
Qty: 20 | Refills: 0 | Status: DISCONTINUED | OUTPATIENT
Start: 2022-05-26 | End: 2022-05-26

## 2022-05-26 RX ORDER — LABETALOL HCL 100 MG
20 TABLET ORAL ONCE
Refills: 0 | Status: COMPLETED | OUTPATIENT
Start: 2022-05-26 | End: 2022-05-27

## 2022-05-26 RX ORDER — SODIUM CHLORIDE 9 MG/ML
1000 INJECTION INTRAMUSCULAR; INTRAVENOUS; SUBCUTANEOUS
Refills: 0 | Status: DISCONTINUED | OUTPATIENT
Start: 2022-05-26 | End: 2022-05-28

## 2022-05-26 RX ORDER — MAGNESIUM SULFATE 500 MG/ML
4 VIAL (ML) INJECTION ONCE
Refills: 0 | Status: COMPLETED | OUTPATIENT
Start: 2022-05-26 | End: 2022-05-26

## 2022-05-26 RX ORDER — SODIUM CHLORIDE 9 MG/ML
1000 INJECTION, SOLUTION INTRAVENOUS
Refills: 0 | Status: DISCONTINUED | OUTPATIENT
Start: 2022-05-26 | End: 2022-05-28

## 2022-05-26 RX ADMIN — Medication 300 GRAM(S): at 19:26

## 2022-05-26 RX ADMIN — Medication 50 GM/HR: at 20:30

## 2022-05-26 NOTE — OB PROVIDER TRIAGE NOTE - NSHPPHYSICALEXAM_GEN_ALL_CORE
Vital Signs Last 24 Hrs  T(C): 36.8 (26 May 2022 13:59), Max: 36.8 (26 May 2022 13:28)  T(F): 98.24 (26 May 2022 13:59), Max: 98.24 (26 May 2022 13:59)  HR: 74 (26 May 2022 14:21) (68 - 74)  BP: 148/82 (26 May 2022 14:21) (140/77 - 161/76)  RR: 18 (26 May 2022 13:28) (18 - 18)  FHR:  CTX: Vital Signs Last 24 Hrs  T(C): 36.8 (26 May 2022 13:59), Max: 36.8 (26 May 2022 13:28)  T(F): 98.24 (26 May 2022 13:59), Max: 98.24 (26 May 2022 13:59)  HR: 74 (26 May 2022 14:21) (68 - 74)  BP: 148/82 (26 May 2022 14:21) (140/77 - 161/76)  RR: 18 (26 May 2022 13:28) (18 - 18)  FHR: Category 1  CTX: no contractions

## 2022-05-26 NOTE — OB PROVIDER TRIAGE NOTE - NSOBPROVIDERNOTE_OBGYN_ALL_OB_FT
Patient is a 28 y/o EDC 2022 EGA 38 2/  with elevated blood pressures today at /99, 143/102 and in D&T 161/76 with reports of headache  - admit to labor and delivery for induction of labor, OBGYN resident and    - patient for oral cytotec and balloon  - patient does not meet criteria for Magnesium Sulfate at this time  - GBS status: unknown  - admission consents obtained  - no COVID testing obtained, patient recently diagnosed COVID 2022, result in [place]  - HELLP labs ordered, results pending

## 2022-05-26 NOTE — OB PROVIDER H&P - ASSESSMENT
Patient is a 26 y/o EDC 2022 EGA 38 2/  with elevated blood pressures today at /99, 143/102 and in D&T 161/76 with reports of headache  - admit to labor and delivery for induction of labor, OBGYN resident and    - patient for oral cytotec and balloon  - patient does not meet criteria for Magnesium Sulfate at this time  - GBS status: unknown  - admission consents obtained  - no COVID testing obtained, patient recently diagnosed COVID 2022, result in [place]  - HELLP labs ordered, results pending

## 2022-05-26 NOTE — OB RN TRIAGE NOTE - CHIEF COMPLAINT QUOTE
atu sent over for elevated bloodpressures /h/a  possible delivery atu sent over for elevated blood pressures /h/a  possible delivery

## 2022-05-26 NOTE — OB PROVIDER H&P - PROBLEM SELECTOR PLAN 1
Patient is a 26 y/o EDC 2022 EGA 38 2/  with elevated blood pressures today at /99, 143/102 and in D&T 161/76 with reports of headache  - admit to labor and delivery for induction of labor, OBGYN resident and    - patient for oral cytotec and balloon  - patient does not meet criteria for Magnesium Sulfate at this time  - GBS status: unknown  - admission consents obtained  - no COVID testing obtained, patient recently diagnosed COVID 2022, result in [place]  - HELLP labs ordered, results pending

## 2022-05-26 NOTE — OB RN TRIAGE NOTE - FALL HARM RISK - UNIVERSAL INTERVENTIONS
Bed in lowest position, wheels locked, appropriate side rails in place/Call bell, personal items and telephone in reach/Instruct patient to call for assistance before getting out of bed or chair/Non-slip footwear when patient is out of bed/[place] to call system/Physically safe environment - no spills, clutter or unnecessary equipment/Purposeful Proactive Rounding/Room/bathroom lighting operational, light cord in reach

## 2022-05-26 NOTE — OB PROVIDER H&P - NSHPPHYSICALEXAM_GEN_ALL_CORE
Vital Signs Last 24 Hrs  T(C): 36.8 (26 May 2022 13:59), Max: 36.8 (26 May 2022 13:28)  T(F): 98.24 (26 May 2022 13:59), Max: 98.24 (26 May 2022 13:59)  HR: 74 (26 May 2022 14:21) (68 - 74)  BP: 148/82 (26 May 2022 14:21) (140/77 - 161/76)  RR: 18 (26 May 2022 13:28) (18 - 18)  FHR: Category 1  CTX: no contractions  TAS: cephalic presentation

## 2022-05-26 NOTE — OB RN TRIAGE NOTE - RESPIRATORY RATE (BREATHS/MIN)
"Angie Garcia's goals for this visit include:   Chief Complaint   Patient presents with     Heart Problem       She requests these members of her care team be copied on today's visit information: Yes PCP    PCP: Lety Luciano    Referring Provider:  Lety Luciano MD  [address] [facility]  [place] [id number]    Chief Complaint   Patient presents with     Heart Problem       Initial BP (!) 151/104  Pulse 163  Resp 24  Ht 1.556 m (5' 1.25\")  Wt 61.6 kg (135 lb 12.9 oz)  SpO2 99%  BMI 25.45 kg/m2 Estimated body mass index is 25.45 kg/(m^2) as calculated from the following:    Height as of this encounter: 1.556 m (5' 1.25\").    Weight as of this encounter: 61.6 kg (135 lb 12.9 oz).  Medication Reconciliation: complete    Do you need any medication refills at today's visit? NO    "
18

## 2022-05-26 NOTE — OB PROVIDER TRIAGE NOTE - HISTORY OF PRESENT ILLNESS
Patient is a PCAP patient, 28 y/o  EDC 2022 EGA 38  sent from ATU for rule out PEC. Patient at this time reports of headache. Patient reports of Tylenol 500 mg x 1 tablet this morning. Patient reports of rib cage pain, right side. Patient associated pain with cough secondary to COVID ( positive on 2022.) Patient denies visual disturbances, nausea, vomiting.     AP complications:  -GDMA1, patient reports unable to stick herself to obtain blood glucose  - Patient reports having ATU sono today, cephalic presentation and EFW 6-15 as per patient  -Rhogam 3/15/2022  Medical History: Obesity   Surgical History: Denies  OBGYN History: Denies

## 2022-05-26 NOTE — CHART NOTE - NSCHARTNOTEFT_GEN_A_CORE
1608    House officer at the bedside evaluating the pt given severe range BPs warranting order for Labetalol 20mg IVP. Currently she denies any chest pain, shortness of breath, n/v, headaches, or scotomas    Gen: No acute distress. Awake. Alert  CV: Regular rate and rhythm. No murmurs appreciated  Pulm: Clear to auscultation bilaterally. No respiratory distress. No wheezes, rales, or rhonchi  Abd: Soft. Gravid. Non-tender  Extremities: No calf tenderness bilaterally. 1+ pitting edema to the mid calf    ICU Vital Signs Last 24 Hrs  T(C): 36.8 (26 May 2022 17:36), Max: 36.8 (26 May 2022 13:28)  T(F): 98.2 (26 May 2022 17:36), Max: 98.24 (26 May 2022 13:59)  HR: 72 (26 May 2022 18:26) (68 - 88)  BP: 143/70 (26 May 2022 18:12) (137/70 - 162/98)  BP(mean): --  ABP: --  ABP(mean): --  RR: 17 (26 May 2022 17:36) (17 - 18)  SpO2: 99% (26 May 2022 18:21) (93% - 100%)    A/P:    Incomplete 1608    House officer at the bedside evaluating the pt given severe range BPs warranting order for Labetalol 20mg IVP. Currently she denies any chest pain, shortness of breath, n/v, headaches, or scotomas    Gen: No acute distress. Awake. Alert  CV: Regular rate and rhythm. No murmurs appreciated  Pulm: Clear to auscultation bilaterally. No respiratory distress. No wheezes, rales, or rhonchi  Abd: Soft. Gravid. Non-tender  Extremities: No calf tenderness bilaterally. 1+ pitting edema to the mid calf    ICU Vital Signs Last 24 Hrs  T(C): 36.8 (26 May 2022 17:36), Max: 36.8 (26 May 2022 13:28)  T(F): 98.2 (26 May 2022 17:36), Max: 98.24 (26 May 2022 13:59)  HR: 72 (26 May 2022 18:26) (68 - 88)  BP: 143/70 (26 May 2022 18:12) (137/70 - 162/98)  BP(mean): --  ABP: --  ABP(mean): --  RR: 17 (26 May 2022 17:36) (17 - 18)  SpO2: 99% (26 May 2022 18:21) (93% - 100%)    A/P: 26yo  @ 38.2wga being induced for PEC w/ severe features. Patient to be started on Mg. 20mg of IVP Labetalol was ordered, but BP 20 minutes later was improved at 143/70 (Labetalol was not yet administered prior to evaluation).   - Reviewed plan to start Mg w/ pt   - Labetalol held given normal repeat. Will continue to monitor BPs     Kelechi Fischer  PGY-1, Obstetrics & Gynecology     d/w Dr. Hoyos

## 2022-05-26 NOTE — OB RN TRIAGE NOTE - NSICDXPASTMEDICALHX_GEN_ALL_CORE_FT
PAST MEDICAL HISTORY:  No pertinent past medical history PAST MEDICAL HISTORY:  Gestational diabetes, diet controlled

## 2022-05-26 NOTE — OB RN PATIENT PROFILE - NS_PRENATALLABSOURCEGBSBACTPN_OBGYN_ALL_OB
Result note for VL JOSE ANTONIO BILATERAL LIMITED 1-2 LEVELS      ----- Message from Naveed Arriaga MD sent at 5/20/2022  4:04 PM EDT -----  Primary problem was on left side, if she has no symptoms, monitor and for medical therapy, otherwise, proceed with RLE intervention per Shayy Yanez unknown

## 2022-05-26 NOTE — OB RN TRIAGE NOTE - NS_TRIAGEPROVIDERNOTIFIED_OBGYN_ALL_OB_FT
Include Location In Plan?: No
Detail Level: Generalized
BLOSSOM BLOOM/CASSI Jc NP/ESEQUIEL Mckeon NP/DIANELYS Taveras NP

## 2022-05-27 ENCOUNTER — NON-APPOINTMENT (OUTPATIENT)
Age: 27
End: 2022-05-27

## 2022-05-27 LAB
ALBUMIN SERPL ELPH-MCNC: 3.1 G/DL — LOW (ref 3.3–5)
ALBUMIN SERPL ELPH-MCNC: 3.2 G/DL — LOW (ref 3.3–5)
ALBUMIN SERPL ELPH-MCNC: 3.5 G/DL — SIGNIFICANT CHANGE UP (ref 3.3–5)
ALP SERPL-CCNC: 136 U/L — HIGH (ref 40–120)
ALP SERPL-CCNC: 139 U/L — HIGH (ref 40–120)
ALP SERPL-CCNC: 142 U/L — HIGH (ref 40–120)
ALT FLD-CCNC: 10 U/L — SIGNIFICANT CHANGE UP (ref 4–33)
ALT FLD-CCNC: 7 U/L — SIGNIFICANT CHANGE UP (ref 4–33)
ALT FLD-CCNC: 8 U/L — SIGNIFICANT CHANGE UP (ref 4–33)
ANION GAP SERPL CALC-SCNC: 10 MMOL/L — SIGNIFICANT CHANGE UP (ref 7–14)
ANION GAP SERPL CALC-SCNC: 12 MMOL/L — SIGNIFICANT CHANGE UP (ref 7–14)
ANION GAP SERPL CALC-SCNC: 13 MMOL/L — SIGNIFICANT CHANGE UP (ref 7–14)
APTT BLD: 26 SEC — LOW (ref 27–36.3)
APTT BLD: 26 SEC — LOW (ref 27–36.3)
AST SERPL-CCNC: 11 U/L — SIGNIFICANT CHANGE UP (ref 4–32)
AST SERPL-CCNC: 12 U/L — SIGNIFICANT CHANGE UP (ref 4–32)
AST SERPL-CCNC: 14 U/L — SIGNIFICANT CHANGE UP (ref 4–32)
BASOPHILS # BLD AUTO: 0.03 K/UL — SIGNIFICANT CHANGE UP (ref 0–0.2)
BASOPHILS # BLD AUTO: 0.03 K/UL — SIGNIFICANT CHANGE UP (ref 0–0.2)
BASOPHILS # BLD AUTO: 0.04 K/UL — SIGNIFICANT CHANGE UP (ref 0–0.2)
BASOPHILS NFR BLD AUTO: 0.4 % — SIGNIFICANT CHANGE UP (ref 0–2)
BASOPHILS NFR BLD AUTO: 0.4 % — SIGNIFICANT CHANGE UP (ref 0–2)
BASOPHILS NFR BLD AUTO: 0.5 % — SIGNIFICANT CHANGE UP (ref 0–2)
BILIRUB SERPL-MCNC: 0.2 MG/DL — SIGNIFICANT CHANGE UP (ref 0.2–1.2)
BILIRUB SERPL-MCNC: <0.2 MG/DL — SIGNIFICANT CHANGE UP (ref 0.2–1.2)
BILIRUB SERPL-MCNC: <0.2 MG/DL — SIGNIFICANT CHANGE UP (ref 0.2–1.2)
BUN SERPL-MCNC: 4 MG/DL — LOW (ref 7–23)
BUN SERPL-MCNC: 4 MG/DL — LOW (ref 7–23)
BUN SERPL-MCNC: 5 MG/DL — LOW (ref 7–23)
C TRACH RRNA SPEC QL NAA+PROBE: SIGNIFICANT CHANGE UP
CALCIUM SERPL-MCNC: 7.6 MG/DL — LOW (ref 8.4–10.5)
CALCIUM SERPL-MCNC: 7.9 MG/DL — LOW (ref 8.4–10.5)
CALCIUM SERPL-MCNC: 8.4 MG/DL — SIGNIFICANT CHANGE UP (ref 8.4–10.5)
CHLORIDE SERPL-SCNC: 102 MMOL/L — SIGNIFICANT CHANGE UP (ref 98–107)
CO2 SERPL-SCNC: 18 MMOL/L — LOW (ref 22–31)
CO2 SERPL-SCNC: 19 MMOL/L — LOW (ref 22–31)
CO2 SERPL-SCNC: 20 MMOL/L — LOW (ref 22–31)
CREAT SERPL-MCNC: 0.5 MG/DL — SIGNIFICANT CHANGE UP (ref 0.5–1.3)
CREAT SERPL-MCNC: 0.51 MG/DL — SIGNIFICANT CHANGE UP (ref 0.5–1.3)
CREAT SERPL-MCNC: 0.52 MG/DL — SIGNIFICANT CHANGE UP (ref 0.5–1.3)
EGFR: 132 ML/MIN/1.73M2 — SIGNIFICANT CHANGE UP
EGFR: 133 ML/MIN/1.73M2 — SIGNIFICANT CHANGE UP
EGFR: 133 ML/MIN/1.73M2 — SIGNIFICANT CHANGE UP
EOSINOPHIL # BLD AUTO: 0.01 K/UL — SIGNIFICANT CHANGE UP (ref 0–0.5)
EOSINOPHIL # BLD AUTO: 0.02 K/UL — SIGNIFICANT CHANGE UP (ref 0–0.5)
EOSINOPHIL # BLD AUTO: 0.02 K/UL — SIGNIFICANT CHANGE UP (ref 0–0.5)
EOSINOPHIL NFR BLD AUTO: 0.1 % — SIGNIFICANT CHANGE UP (ref 0–6)
EOSINOPHIL NFR BLD AUTO: 0.2 % — SIGNIFICANT CHANGE UP (ref 0–6)
EOSINOPHIL NFR BLD AUTO: 0.3 % — SIGNIFICANT CHANGE UP (ref 0–6)
FIBRINOGEN PPP-MCNC: 821 MG/DL — HIGH (ref 330–520)
FIBRINOGEN PPP-MCNC: 923 MG/DL — HIGH (ref 330–520)
GLUCOSE SERPL-MCNC: 107 MG/DL — HIGH (ref 70–99)
GLUCOSE SERPL-MCNC: 90 MG/DL — SIGNIFICANT CHANGE UP (ref 70–99)
GLUCOSE SERPL-MCNC: 98 MG/DL — SIGNIFICANT CHANGE UP (ref 70–99)
HCT VFR BLD CALC: 28.1 % — LOW (ref 34.5–45)
HCT VFR BLD CALC: 30.3 % — LOW (ref 34.5–45)
HCT VFR BLD CALC: 31.1 % — LOW (ref 34.5–45)
HGB BLD-MCNC: 8.5 G/DL — LOW (ref 11.5–15.5)
HGB BLD-MCNC: 8.8 G/DL — LOW (ref 11.5–15.5)
HGB BLD-MCNC: 8.9 G/DL — LOW (ref 11.5–15.5)
IANC: 5.98 K/UL — SIGNIFICANT CHANGE UP (ref 1.8–7.4)
IANC: 6.2 K/UL — SIGNIFICANT CHANGE UP (ref 1.8–7.4)
IANC: 6.3 K/UL — SIGNIFICANT CHANGE UP (ref 1.8–7.4)
IMM GRANULOCYTES NFR BLD AUTO: 0.5 % — SIGNIFICANT CHANGE UP (ref 0–1.5)
IMM GRANULOCYTES NFR BLD AUTO: 0.6 % — SIGNIFICANT CHANGE UP (ref 0–1.5)
IMM GRANULOCYTES NFR BLD AUTO: 0.9 % — SIGNIFICANT CHANGE UP (ref 0–1.5)
INR BLD: 0.9 RATIO — SIGNIFICANT CHANGE UP (ref 0.88–1.16)
INR BLD: 0.91 RATIO — SIGNIFICANT CHANGE UP (ref 0.88–1.16)
LYMPHOCYTES # BLD AUTO: 1.05 K/UL — SIGNIFICANT CHANGE UP (ref 1–3.3)
LYMPHOCYTES # BLD AUTO: 1.26 K/UL — SIGNIFICANT CHANGE UP (ref 1–3.3)
LYMPHOCYTES # BLD AUTO: 1.35 K/UL — SIGNIFICANT CHANGE UP (ref 1–3.3)
LYMPHOCYTES # BLD AUTO: 13.6 % — SIGNIFICANT CHANGE UP (ref 13–44)
LYMPHOCYTES # BLD AUTO: 15.3 % — SIGNIFICANT CHANGE UP (ref 13–44)
LYMPHOCYTES # BLD AUTO: 16.1 % — SIGNIFICANT CHANGE UP (ref 13–44)
MAGNESIUM SERPL-MCNC: 4.7 MG/DL — HIGH (ref 1.6–2.6)
MAGNESIUM SERPL-MCNC: 5.7 MG/DL — HIGH (ref 1.6–2.6)
MAGNESIUM SERPL-MCNC: 6.6 MG/DL — HIGH (ref 1.6–2.6)
MCHC RBC-ENTMCNC: 20.9 PG — LOW (ref 27–34)
MCHC RBC-ENTMCNC: 20.9 PG — LOW (ref 27–34)
MCHC RBC-ENTMCNC: 21.1 PG — LOW (ref 27–34)
MCHC RBC-ENTMCNC: 28.6 GM/DL — LOW (ref 32–36)
MCHC RBC-ENTMCNC: 29 GM/DL — LOW (ref 32–36)
MCHC RBC-ENTMCNC: 30.2 GM/DL — LOW (ref 32–36)
MCV RBC AUTO: 69.9 FL — LOW (ref 80–100)
MCV RBC AUTO: 71.8 FL — LOW (ref 80–100)
MCV RBC AUTO: 73 FL — LOW (ref 80–100)
MONOCYTES # BLD AUTO: 0.55 K/UL — SIGNIFICANT CHANGE UP (ref 0–0.9)
MONOCYTES # BLD AUTO: 0.62 K/UL — SIGNIFICANT CHANGE UP (ref 0–0.9)
MONOCYTES # BLD AUTO: 0.67 K/UL — SIGNIFICANT CHANGE UP (ref 0–0.9)
MONOCYTES NFR BLD AUTO: 7.1 % — SIGNIFICANT CHANGE UP (ref 2–14)
MONOCYTES NFR BLD AUTO: 7.4 % — SIGNIFICANT CHANGE UP (ref 2–14)
MONOCYTES NFR BLD AUTO: 8.2 % — SIGNIFICANT CHANGE UP (ref 2–14)
N GONORRHOEA RRNA SPEC QL NAA+PROBE: SIGNIFICANT CHANGE UP
NEUTROPHILS # BLD AUTO: 5.98 K/UL — SIGNIFICANT CHANGE UP (ref 1.8–7.4)
NEUTROPHILS # BLD AUTO: 6.2 K/UL — SIGNIFICANT CHANGE UP (ref 1.8–7.4)
NEUTROPHILS # BLD AUTO: 6.3 K/UL — SIGNIFICANT CHANGE UP (ref 1.8–7.4)
NEUTROPHILS NFR BLD AUTO: 75.3 % — SIGNIFICANT CHANGE UP (ref 43–77)
NEUTROPHILS NFR BLD AUTO: 75.4 % — SIGNIFICANT CHANGE UP (ref 43–77)
NEUTROPHILS NFR BLD AUTO: 77.7 % — HIGH (ref 43–77)
NRBC # BLD: 0 /100 WBCS — SIGNIFICANT CHANGE UP
NRBC # FLD: 0 K/UL — SIGNIFICANT CHANGE UP
NRBC # FLD: 0.02 K/UL — HIGH
NRBC # FLD: 0.02 K/UL — HIGH
PLATELET # BLD AUTO: 325 K/UL — SIGNIFICANT CHANGE UP (ref 150–400)
PLATELET # BLD AUTO: 337 K/UL — SIGNIFICANT CHANGE UP (ref 150–400)
PLATELET # BLD AUTO: 340 K/UL — SIGNIFICANT CHANGE UP (ref 150–400)
POTASSIUM SERPL-MCNC: 4 MMOL/L — SIGNIFICANT CHANGE UP (ref 3.5–5.3)
POTASSIUM SERPL-MCNC: 4.1 MMOL/L — SIGNIFICANT CHANGE UP (ref 3.5–5.3)
POTASSIUM SERPL-MCNC: 4.1 MMOL/L — SIGNIFICANT CHANGE UP (ref 3.5–5.3)
POTASSIUM SERPL-SCNC: 4 MMOL/L — SIGNIFICANT CHANGE UP (ref 3.5–5.3)
POTASSIUM SERPL-SCNC: 4.1 MMOL/L — SIGNIFICANT CHANGE UP (ref 3.5–5.3)
POTASSIUM SERPL-SCNC: 4.1 MMOL/L — SIGNIFICANT CHANGE UP (ref 3.5–5.3)
PROT SERPL-MCNC: 6.6 G/DL — SIGNIFICANT CHANGE UP (ref 6–8.3)
PROT SERPL-MCNC: 6.8 G/DL — SIGNIFICANT CHANGE UP (ref 6–8.3)
PROT SERPL-MCNC: 6.9 G/DL — SIGNIFICANT CHANGE UP (ref 6–8.3)
PROTHROM AB SERPL-ACNC: 10.4 SEC — LOW (ref 10.5–13.4)
PROTHROM AB SERPL-ACNC: 10.6 SEC — SIGNIFICANT CHANGE UP (ref 10.5–13.4)
RBC # BLD: 4.02 M/UL — SIGNIFICANT CHANGE UP (ref 3.8–5.2)
RBC # BLD: 4.22 M/UL — SIGNIFICANT CHANGE UP (ref 3.8–5.2)
RBC # BLD: 4.26 M/UL — SIGNIFICANT CHANGE UP (ref 3.8–5.2)
RBC # FLD: 16 % — HIGH (ref 10.3–14.5)
RBC # FLD: 16.1 % — HIGH (ref 10.3–14.5)
RBC # FLD: 16.2 % — HIGH (ref 10.3–14.5)
SODIUM SERPL-SCNC: 132 MMOL/L — LOW (ref 135–145)
SODIUM SERPL-SCNC: 133 MMOL/L — LOW (ref 135–145)
SODIUM SERPL-SCNC: 133 MMOL/L — LOW (ref 135–145)
SPECIMEN SOURCE: SIGNIFICANT CHANGE UP
T PALLIDUM AB TITR SER: NEGATIVE — SIGNIFICANT CHANGE UP
URATE SERPL-MCNC: 6 MG/DL — SIGNIFICANT CHANGE UP (ref 2.5–7)
URATE SERPL-MCNC: 6.7 MG/DL — SIGNIFICANT CHANGE UP (ref 2.5–7)
WBC # BLD: 7.7 K/UL — SIGNIFICANT CHANGE UP (ref 3.8–10.5)
WBC # BLD: 8.22 K/UL — SIGNIFICANT CHANGE UP (ref 3.8–10.5)
WBC # BLD: 8.37 K/UL — SIGNIFICANT CHANGE UP (ref 3.8–10.5)
WBC # FLD AUTO: 7.7 K/UL — SIGNIFICANT CHANGE UP (ref 3.8–10.5)
WBC # FLD AUTO: 8.22 K/UL — SIGNIFICANT CHANGE UP (ref 3.8–10.5)
WBC # FLD AUTO: 8.37 K/UL — SIGNIFICANT CHANGE UP (ref 3.8–10.5)

## 2022-05-27 RX ORDER — LABETALOL HCL 100 MG
20 TABLET ORAL ONCE
Refills: 0 | Status: COMPLETED | OUTPATIENT
Start: 2022-05-27 | End: 2022-05-27

## 2022-05-27 RX ORDER — ACETAMINOPHEN 500 MG
975 TABLET ORAL ONCE
Refills: 0 | Status: COMPLETED | OUTPATIENT
Start: 2022-05-27 | End: 2022-05-27

## 2022-05-27 RX ORDER — ACETAMINOPHEN 500 MG
1000 TABLET ORAL ONCE
Refills: 0 | Status: COMPLETED | OUTPATIENT
Start: 2022-05-27 | End: 2022-05-27

## 2022-05-27 RX ADMIN — Medication 50 GM/HR: at 19:12

## 2022-05-27 RX ADMIN — Medication 975 MILLIGRAM(S): at 09:23

## 2022-05-27 RX ADMIN — Medication 20 MILLIGRAM(S): at 02:14

## 2022-05-27 RX ADMIN — Medication 400 MILLIGRAM(S): at 02:26

## 2022-05-27 RX ADMIN — Medication 1000 MILLIGRAM(S): at 02:30

## 2022-05-27 RX ADMIN — Medication 50 GM/HR: at 07:16

## 2022-05-27 NOTE — OB PROVIDER LABOR PROGRESS NOTE - ASSESSMENT
- c/w PO cytotec   - cervical balloon discussed with pt. Pt is declining at this time but will reconsider when she becomes more dilated.   - Discussed possible epidural then cervical balloon. Pt will consider    Callie Francis, PGY1  d/w Dr. Pollack PGY3

## 2022-05-27 NOTE — CHART NOTE - NSCHARTNOTEFT_GEN_A_CORE
Patient seen at bedside for sustained severe range BPs. Pt is sPEC on magnesium.   Patient denies headache, blurry vision, shortness of breath, epigastric pain, nausea, or vomiting.   Labetalol 20 mg IVP given. f/u repeat BP   HELLP wnl. Continue with q6 HELLP labs.   Patient counseled on need for outpatient BP monitoring as well as symptomatic self-assessment once discharged  Patient understands risk of stroke and seizure, agrees with plan of care    Callie Francis, PGY1  d/w Dr. Cheung -   d/w Dr. Zambrano PGY4

## 2022-05-27 NOTE — CHART NOTE - NSCHARTNOTEFT_GEN_A_CORE
R1 OB Tracing Note    T(C): 36.4 (05-27-22 @ 10:30), Max: 37.0 (05-26-22 @ 19:30)  HR: 67 (05-27-22 @ 14:04) (64 - 98)  BP: 97/56 (05-27-22 @ 14:04) (97/56 - 191/89)  RR: 19 (05-26-22 @ 18:22) (17 - 19)  SpO2: 96% (05-27-22 @ 14:01) (93% - 100%)    EFM:  125bpm, mod aubrie, +accels, -decels  [place]: cx q2-3min    A/P 27y P0 admitted for IOL 2/2 to PEC w/ severe features   -Labor: Will c/w PO Misoprostol. Pt had refused CRB  -Cat 1 tracing    Kelechi Fischer, PGY-1  Ob/Gyn

## 2022-05-28 ENCOUNTER — TRANSCRIPTION ENCOUNTER (OUTPATIENT)
Age: 27
End: 2022-05-28

## 2022-05-28 LAB
ALBUMIN SERPL ELPH-MCNC: 3.7 G/DL — SIGNIFICANT CHANGE UP (ref 3.3–5)
ALP SERPL-CCNC: 164 U/L — HIGH (ref 40–120)
ALT FLD-CCNC: 12 U/L — SIGNIFICANT CHANGE UP (ref 4–33)
ANION GAP SERPL CALC-SCNC: 15 MMOL/L — HIGH (ref 7–14)
APPEARANCE UR: ABNORMAL
APTT BLD: 26.2 SEC — LOW (ref 27–36.3)
AST SERPL-CCNC: 10 U/L — SIGNIFICANT CHANGE UP (ref 4–32)
BASOPHILS # BLD AUTO: 0.04 K/UL — SIGNIFICANT CHANGE UP (ref 0–0.2)
BASOPHILS NFR BLD AUTO: 0.5 % — SIGNIFICANT CHANGE UP (ref 0–2)
BILIRUB SERPL-MCNC: 0.3 MG/DL — SIGNIFICANT CHANGE UP (ref 0.2–1.2)
BILIRUB UR-MCNC: NEGATIVE — SIGNIFICANT CHANGE UP
BUN SERPL-MCNC: 5 MG/DL — LOW (ref 7–23)
CALCIUM SERPL-MCNC: 7.6 MG/DL — LOW (ref 8.4–10.5)
CHLORIDE SERPL-SCNC: 98 MMOL/L — SIGNIFICANT CHANGE UP (ref 98–107)
CO2 SERPL-SCNC: 18 MMOL/L — LOW (ref 22–31)
COLOR SPEC: SIGNIFICANT CHANGE UP
CREAT ?TM UR-MCNC: 77 MG/DL — SIGNIFICANT CHANGE UP
CREAT SERPL-MCNC: 0.63 MG/DL — SIGNIFICANT CHANGE UP (ref 0.5–1.3)
DIFF PNL FLD: NEGATIVE — SIGNIFICANT CHANGE UP
EGFR: 126 ML/MIN/1.73M2 — SIGNIFICANT CHANGE UP
EOSINOPHIL # BLD AUTO: 0.05 K/UL — SIGNIFICANT CHANGE UP (ref 0–0.5)
EOSINOPHIL NFR BLD AUTO: 0.6 % — SIGNIFICANT CHANGE UP (ref 0–6)
FIBRINOGEN PPP-MCNC: >1000 MG/DL — HIGH (ref 330–520)
GLUCOSE SERPL-MCNC: 91 MG/DL — SIGNIFICANT CHANGE UP (ref 70–99)
GLUCOSE UR QL: NEGATIVE — SIGNIFICANT CHANGE UP
GROUP B BETA STREP DNA (PCR): SIGNIFICANT CHANGE UP
GROUP B BETA STREP INTERPRETATION: SIGNIFICANT CHANGE UP
HCT VFR BLD CALC: 33.6 % — LOW (ref 34.5–45)
HGB BLD-MCNC: 9.9 G/DL — LOW (ref 11.5–15.5)
IANC: 7 K/UL — SIGNIFICANT CHANGE UP (ref 1.8–7.4)
IMM GRANULOCYTES NFR BLD AUTO: 0.6 % — SIGNIFICANT CHANGE UP (ref 0–1.5)
INR BLD: 0.91 RATIO — SIGNIFICANT CHANGE UP (ref 0.88–1.16)
KETONES UR-MCNC: ABNORMAL
LDH SERPL L TO P-CCNC: 284 U/L — HIGH (ref 135–225)
LEUKOCYTE ESTERASE UR-ACNC: ABNORMAL
LYMPHOCYTES # BLD AUTO: 1.16 K/UL — SIGNIFICANT CHANGE UP (ref 1–3.3)
LYMPHOCYTES # BLD AUTO: 13.2 % — SIGNIFICANT CHANGE UP (ref 13–44)
MAGNESIUM SERPL-MCNC: 6.6 MG/DL — HIGH (ref 1.6–2.6)
MAGNESIUM SERPL-MCNC: 6.8 MG/DL — HIGH (ref 1.6–2.6)
MAGNESIUM SERPL-MCNC: 8 MG/DL — CRITICAL HIGH (ref 1.6–2.6)
MAGNESIUM SERPL-MCNC: 8.2 MG/DL — CRITICAL HIGH (ref 1.6–2.6)
MCHC RBC-ENTMCNC: 20.9 PG — LOW (ref 27–34)
MCHC RBC-ENTMCNC: 29.5 GM/DL — LOW (ref 32–36)
MCV RBC AUTO: 71 FL — LOW (ref 80–100)
MONOCYTES # BLD AUTO: 0.47 K/UL — SIGNIFICANT CHANGE UP (ref 0–0.9)
MONOCYTES NFR BLD AUTO: 5.4 % — SIGNIFICANT CHANGE UP (ref 2–14)
NEUTROPHILS # BLD AUTO: 7 K/UL — SIGNIFICANT CHANGE UP (ref 1.8–7.4)
NEUTROPHILS NFR BLD AUTO: 79.7 % — HIGH (ref 43–77)
NITRITE UR-MCNC: NEGATIVE — SIGNIFICANT CHANGE UP
NRBC # BLD: 0 /100 WBCS — SIGNIFICANT CHANGE UP
NRBC # FLD: 0 K/UL — SIGNIFICANT CHANGE UP
PH UR: 6 — SIGNIFICANT CHANGE UP (ref 5–8)
PLATELET # BLD AUTO: 382 K/UL — SIGNIFICANT CHANGE UP (ref 150–400)
POTASSIUM SERPL-MCNC: 3.9 MMOL/L — SIGNIFICANT CHANGE UP (ref 3.5–5.3)
POTASSIUM SERPL-SCNC: 3.9 MMOL/L — SIGNIFICANT CHANGE UP (ref 3.5–5.3)
PROT ?TM UR-MCNC: 10 MG/DL — SIGNIFICANT CHANGE UP
PROT ?TM UR-MCNC: 10 MG/DL — SIGNIFICANT CHANGE UP
PROT SERPL-MCNC: 7.4 G/DL — SIGNIFICANT CHANGE UP (ref 6–8.3)
PROT UR-MCNC: NEGATIVE — SIGNIFICANT CHANGE UP
PROT/CREAT UR-RTO: 0.1 RATIO — SIGNIFICANT CHANGE UP (ref 0–0.2)
PROTHROM AB SERPL-ACNC: 10.6 SEC — SIGNIFICANT CHANGE UP (ref 10.5–13.4)
RBC # BLD: 4.73 M/UL — SIGNIFICANT CHANGE UP (ref 3.8–5.2)
RBC # FLD: 16.2 % — HIGH (ref 10.3–14.5)
SODIUM SERPL-SCNC: 131 MMOL/L — LOW (ref 135–145)
SOURCE GROUP B STREP: SIGNIFICANT CHANGE UP
SP GR SPEC: 1.02 — SIGNIFICANT CHANGE UP (ref 1–1.05)
URATE SERPL-MCNC: 7 MG/DL — SIGNIFICANT CHANGE UP (ref 2.5–7)
UROBILINOGEN FLD QL: SIGNIFICANT CHANGE UP
WBC # BLD: 8.77 K/UL — SIGNIFICANT CHANGE UP (ref 3.8–10.5)
WBC # FLD AUTO: 8.77 K/UL — SIGNIFICANT CHANGE UP (ref 3.8–10.5)

## 2022-05-28 RX ORDER — ONDANSETRON 8 MG/1
4 TABLET, FILM COATED ORAL ONCE
Refills: 0 | Status: COMPLETED | OUTPATIENT
Start: 2022-05-28 | End: 2022-05-28

## 2022-05-28 RX ORDER — NIFEDIPINE 30 MG
30 TABLET, EXTENDED RELEASE 24 HR ORAL DAILY
Refills: 0 | Status: DISCONTINUED | OUTPATIENT
Start: 2022-05-28 | End: 2022-05-28

## 2022-05-28 RX ORDER — NIFEDIPINE 30 MG
20 TABLET, EXTENDED RELEASE 24 HR ORAL ONCE
Refills: 0 | Status: COMPLETED | OUTPATIENT
Start: 2022-05-28 | End: 2022-05-28

## 2022-05-28 RX ORDER — MAGNESIUM SULFATE 500 MG/ML
1.5 VIAL (ML) INJECTION
Qty: 40 | Refills: 0 | Status: DISCONTINUED | OUTPATIENT
Start: 2022-05-28 | End: 2022-05-29

## 2022-05-28 RX ORDER — NIFEDIPINE 30 MG
30 TABLET, EXTENDED RELEASE 24 HR ORAL DAILY
Refills: 0 | Status: DISCONTINUED | OUTPATIENT
Start: 2022-05-28 | End: 2022-05-29

## 2022-05-28 RX ORDER — OXYTOCIN 10 UNIT/ML
VIAL (ML) INJECTION
Qty: 30 | Refills: 0 | Status: DISCONTINUED | OUTPATIENT
Start: 2022-05-28 | End: 2022-05-28

## 2022-05-28 RX ORDER — NIFEDIPINE 30 MG
10 TABLET, EXTENDED RELEASE 24 HR ORAL ONCE
Refills: 0 | Status: COMPLETED | OUTPATIENT
Start: 2022-05-28 | End: 2022-05-28

## 2022-05-28 RX ORDER — ACETAMINOPHEN 500 MG
975 TABLET ORAL ONCE
Refills: 0 | Status: COMPLETED | OUTPATIENT
Start: 2022-05-28 | End: 2022-05-28

## 2022-05-28 RX ORDER — SODIUM CHLORIDE 9 MG/ML
1000 INJECTION INTRAMUSCULAR; INTRAVENOUS; SUBCUTANEOUS
Refills: 0 | Status: DISCONTINUED | OUTPATIENT
Start: 2022-05-28 | End: 2022-05-29

## 2022-05-28 RX ORDER — OXYTOCIN 10 UNIT/ML
333.33 VIAL (ML) INJECTION
Qty: 20 | Refills: 0 | Status: DISCONTINUED | OUTPATIENT
Start: 2022-05-28 | End: 2022-05-29

## 2022-05-28 RX ORDER — MAGNESIUM SULFATE 500 MG/ML
2 VIAL (ML) INJECTION
Qty: 40 | Refills: 0 | Status: DISCONTINUED | OUTPATIENT
Start: 2022-05-28 | End: 2022-05-28

## 2022-05-28 RX ORDER — SODIUM CHLORIDE 9 MG/ML
1000 INJECTION, SOLUTION INTRAVENOUS
Refills: 0 | Status: DISCONTINUED | OUTPATIENT
Start: 2022-05-28 | End: 2022-05-29

## 2022-05-28 RX ORDER — MAGNESIUM SULFATE 500 MG/ML
1.5 VIAL (ML) INJECTION
Qty: 40 | Refills: 0 | Status: DISCONTINUED | OUTPATIENT
Start: 2022-05-28 | End: 2022-05-28

## 2022-05-28 RX ORDER — OXYTOCIN 10 UNIT/ML
VIAL (ML) INJECTION
Qty: 30 | Refills: 0 | Status: DISCONTINUED | OUTPATIENT
Start: 2022-05-28 | End: 2022-05-29

## 2022-05-28 RX ADMIN — SODIUM CHLORIDE 125 MILLILITER(S): 9 INJECTION INTRAMUSCULAR; INTRAVENOUS; SUBCUTANEOUS at 23:12

## 2022-05-28 RX ADMIN — SODIUM CHLORIDE 125 MILLILITER(S): 9 INJECTION, SOLUTION INTRAVENOUS at 13:40

## 2022-05-28 RX ADMIN — Medication 20 MILLIGRAM(S): at 01:16

## 2022-05-28 RX ADMIN — Medication 50 GM/HR: at 08:49

## 2022-05-28 RX ADMIN — Medication 2 MILLIUNIT(S)/MIN: at 14:09

## 2022-05-28 RX ADMIN — Medication 37.5 GM/HR: at 13:25

## 2022-05-28 RX ADMIN — ONDANSETRON 4 MILLIGRAM(S): 8 TABLET, FILM COATED ORAL at 04:59

## 2022-05-28 RX ADMIN — Medication 30 MILLIGRAM(S): at 08:53

## 2022-05-28 RX ADMIN — Medication 37.5 GM/HR: at 19:19

## 2022-05-28 RX ADMIN — Medication 10 MILLIGRAM(S): at 00:51

## 2022-05-28 RX ADMIN — SODIUM CHLORIDE 125 MILLILITER(S): 9 INJECTION, SOLUTION INTRAVENOUS at 19:36

## 2022-05-28 RX ADMIN — Medication 2 MILLIUNIT(S)/MIN: at 07:37

## 2022-05-28 RX ADMIN — Medication 0.25 MILLIGRAM(S): at 21:13

## 2022-05-28 RX ADMIN — Medication 975 MILLIGRAM(S): at 04:52

## 2022-05-28 NOTE — OB PROVIDER LABOR PROGRESS NOTE - ASSESSMENT
Plan:  26y/o P0, sPEC on magnesium, A1 IOL in stable condition  - terb x1  - pitocin paused  - cont to monitor, restart pitocin when able  - Continuous EFM, [place]  - Con't IVF  OB  Dr. Costello at bedside  Laura Cohn, PGY-1   Plan:  28y/o P0, sPEC on magnesium, A1 IOL in stable condition  - terb x1  - pitocin paused  - cont to monitor, restart pitocin when able  - Continuous EFM, [place]  - Con't IVF  OB  Dr. Costello at bedside  Laura Davis-Aury, PGY-1    Service attending    Events as above- I participated in patient care.  Upon transfer to OR3 FHT back to baseline  cat 1 for prolonged NST on OR and patient transferred back to LDR 10  will observe before restarting pitocin  patient made aware that recurrence of decelerations will lead to recommendation of  section    Sandip NESS

## 2022-05-29 ENCOUNTER — RESULT REVIEW (OUTPATIENT)
Age: 27
End: 2022-05-29

## 2022-05-29 LAB
ALBUMIN SERPL ELPH-MCNC: 2.5 G/DL — LOW (ref 3.3–5)
ALBUMIN SERPL ELPH-MCNC: 2.9 G/DL — LOW (ref 3.3–5)
ALBUMIN SERPL ELPH-MCNC: 3.1 G/DL — LOW (ref 3.3–5)
ALP SERPL-CCNC: 125 U/L — HIGH (ref 40–120)
ALP SERPL-CCNC: 139 U/L — HIGH (ref 40–120)
ALP SERPL-CCNC: 142 U/L — HIGH (ref 40–120)
ALT FLD-CCNC: 5 U/L — SIGNIFICANT CHANGE UP (ref 4–33)
ALT FLD-CCNC: 6 U/L — SIGNIFICANT CHANGE UP (ref 4–33)
ALT FLD-CCNC: 7 U/L — SIGNIFICANT CHANGE UP (ref 4–33)
ANION GAP SERPL CALC-SCNC: 14 MMOL/L — SIGNIFICANT CHANGE UP (ref 7–14)
ANION GAP SERPL CALC-SCNC: 14 MMOL/L — SIGNIFICANT CHANGE UP (ref 7–14)
ANION GAP SERPL CALC-SCNC: 17 MMOL/L — HIGH (ref 7–14)
APTT BLD: 23.2 SEC — LOW (ref 27–36.3)
APTT BLD: 25.8 SEC — LOW (ref 27–36.3)
AST SERPL-CCNC: 13 U/L — SIGNIFICANT CHANGE UP (ref 4–32)
AST SERPL-CCNC: 13 U/L — SIGNIFICANT CHANGE UP (ref 4–32)
AST SERPL-CCNC: 14 U/L — SIGNIFICANT CHANGE UP (ref 4–32)
BASOPHILS # BLD AUTO: 0.03 K/UL — SIGNIFICANT CHANGE UP (ref 0–0.2)
BASOPHILS # BLD AUTO: 0.03 K/UL — SIGNIFICANT CHANGE UP (ref 0–0.2)
BASOPHILS # BLD AUTO: 0.05 K/UL — SIGNIFICANT CHANGE UP (ref 0–0.2)
BASOPHILS NFR BLD AUTO: 0.2 % — SIGNIFICANT CHANGE UP (ref 0–2)
BASOPHILS NFR BLD AUTO: 0.3 % — SIGNIFICANT CHANGE UP (ref 0–2)
BASOPHILS NFR BLD AUTO: 0.4 % — SIGNIFICANT CHANGE UP (ref 0–2)
BILIRUB SERPL-MCNC: 0.3 MG/DL — SIGNIFICANT CHANGE UP (ref 0.2–1.2)
BILIRUB SERPL-MCNC: <0.2 MG/DL — SIGNIFICANT CHANGE UP (ref 0.2–1.2)
BILIRUB SERPL-MCNC: <0.2 MG/DL — SIGNIFICANT CHANGE UP (ref 0.2–1.2)
BLD GP AB SCN SERPL QL: NEGATIVE — SIGNIFICANT CHANGE UP
BUN SERPL-MCNC: 7 MG/DL — SIGNIFICANT CHANGE UP (ref 7–23)
BUN SERPL-MCNC: 8 MG/DL — SIGNIFICANT CHANGE UP (ref 7–23)
BUN SERPL-MCNC: 8 MG/DL — SIGNIFICANT CHANGE UP (ref 7–23)
CALCIUM SERPL-MCNC: 6.8 MG/DL — LOW (ref 8.4–10.5)
CALCIUM SERPL-MCNC: 6.9 MG/DL — LOW (ref 8.4–10.5)
CALCIUM SERPL-MCNC: 7.2 MG/DL — LOW (ref 8.4–10.5)
CHLORIDE SERPL-SCNC: 101 MMOL/L — SIGNIFICANT CHANGE UP (ref 98–107)
CHLORIDE SERPL-SCNC: 102 MMOL/L — SIGNIFICANT CHANGE UP (ref 98–107)
CHLORIDE SERPL-SCNC: 99 MMOL/L — SIGNIFICANT CHANGE UP (ref 98–107)
CO2 SERPL-SCNC: 15 MMOL/L — LOW (ref 22–31)
CO2 SERPL-SCNC: 16 MMOL/L — LOW (ref 22–31)
CO2 SERPL-SCNC: 18 MMOL/L — LOW (ref 22–31)
CREAT SERPL-MCNC: 0.7 MG/DL — SIGNIFICANT CHANGE UP (ref 0.5–1.3)
CREAT SERPL-MCNC: 0.76 MG/DL — SIGNIFICANT CHANGE UP (ref 0.5–1.3)
CREAT SERPL-MCNC: 0.85 MG/DL — SIGNIFICANT CHANGE UP (ref 0.5–1.3)
EGFR: 111 ML/MIN/1.73M2 — SIGNIFICANT CHANGE UP
EGFR: 123 ML/MIN/1.73M2 — SIGNIFICANT CHANGE UP
EGFR: 97 ML/MIN/1.73M2 — SIGNIFICANT CHANGE UP
EOSINOPHIL # BLD AUTO: 0.05 K/UL — SIGNIFICANT CHANGE UP (ref 0–0.5)
EOSINOPHIL # BLD AUTO: 0.05 K/UL — SIGNIFICANT CHANGE UP (ref 0–0.5)
EOSINOPHIL # BLD AUTO: 0.06 K/UL — SIGNIFICANT CHANGE UP (ref 0–0.5)
EOSINOPHIL NFR BLD AUTO: 0.4 % — SIGNIFICANT CHANGE UP (ref 0–6)
EOSINOPHIL NFR BLD AUTO: 0.5 % — SIGNIFICANT CHANGE UP (ref 0–6)
EOSINOPHIL NFR BLD AUTO: 0.5 % — SIGNIFICANT CHANGE UP (ref 0–6)
FIBRINOGEN PPP-MCNC: 900 MG/DL — HIGH (ref 330–520)
FIBRINOGEN PPP-MCNC: >1000 MG/DL — HIGH (ref 330–520)
GLUCOSE SERPL-MCNC: 107 MG/DL — HIGH (ref 70–99)
GLUCOSE SERPL-MCNC: 119 MG/DL — HIGH (ref 70–99)
GLUCOSE SERPL-MCNC: 92 MG/DL — SIGNIFICANT CHANGE UP (ref 70–99)
HCT VFR BLD CALC: 26.4 % — LOW (ref 34.5–45)
HCT VFR BLD CALC: 27.7 % — LOW (ref 34.5–45)
HCT VFR BLD CALC: 28.6 % — LOW (ref 34.5–45)
HCT VFR BLD CALC: 29.5 % — LOW (ref 34.5–45)
HGB BLD-MCNC: 7.7 G/DL — LOW (ref 11.5–15.5)
HGB BLD-MCNC: 7.7 G/DL — LOW (ref 11.5–15.5)
HGB BLD-MCNC: 8.3 G/DL — LOW (ref 11.5–15.5)
HGB BLD-MCNC: 8.6 G/DL — LOW (ref 11.5–15.5)
IANC: 11.37 K/UL — HIGH (ref 1.8–7.4)
IANC: 16.53 K/UL — HIGH (ref 1.8–7.4)
IANC: 8.56 K/UL — HIGH (ref 1.8–7.4)
IANC: 9.13 K/UL — HIGH (ref 1.8–7.4)
IMM GRANULOCYTES NFR BLD AUTO: 0.4 % — SIGNIFICANT CHANGE UP (ref 0–1.5)
IMM GRANULOCYTES NFR BLD AUTO: 0.5 % — SIGNIFICANT CHANGE UP (ref 0–1.5)
IMM GRANULOCYTES NFR BLD AUTO: 0.5 % — SIGNIFICANT CHANGE UP (ref 0–1.5)
INR BLD: <0.9 RATIO — SIGNIFICANT CHANGE UP (ref 0.88–1.16)
INR BLD: <0.9 RATIO — SIGNIFICANT CHANGE UP (ref 0.88–1.16)
LDH SERPL L TO P-CCNC: 285 U/L — HIGH (ref 135–225)
LYMPHOCYTES # BLD AUTO: 0.65 K/UL — LOW (ref 1–3.3)
LYMPHOCYTES # BLD AUTO: 0.95 K/UL — LOW (ref 1–3.3)
LYMPHOCYTES # BLD AUTO: 1.33 K/UL — SIGNIFICANT CHANGE UP (ref 1–3.3)
LYMPHOCYTES # BLD AUTO: 11.7 % — LOW (ref 13–44)
LYMPHOCYTES # BLD AUTO: 5.1 % — LOW (ref 13–44)
LYMPHOCYTES # BLD AUTO: 9 % — LOW (ref 13–44)
MAGNESIUM SERPL-MCNC: 5.6 MG/DL — HIGH (ref 1.6–2.6)
MAGNESIUM SERPL-MCNC: 6 MG/DL — HIGH (ref 1.6–2.6)
MAGNESIUM SERPL-MCNC: 6.7 MG/DL — HIGH (ref 1.6–2.6)
MAGNESIUM SERPL-MCNC: 7.6 MG/DL — CRITICAL HIGH (ref 1.6–2.6)
MCHC RBC-ENTMCNC: 20.9 PG — LOW (ref 27–34)
MCHC RBC-ENTMCNC: 21 PG — LOW (ref 27–34)
MCHC RBC-ENTMCNC: 21 PG — LOW (ref 27–34)
MCHC RBC-ENTMCNC: 21.2 PG — LOW (ref 27–34)
MCHC RBC-ENTMCNC: 27.8 GM/DL — LOW (ref 32–36)
MCHC RBC-ENTMCNC: 29 GM/DL — LOW (ref 32–36)
MCHC RBC-ENTMCNC: 29.2 GM/DL — LOW (ref 32–36)
MCHC RBC-ENTMCNC: 29.2 GM/DL — LOW (ref 32–36)
MCV RBC AUTO: 71.5 FL — LOW (ref 80–100)
MCV RBC AUTO: 72.1 FL — LOW (ref 80–100)
MCV RBC AUTO: 72.4 FL — LOW (ref 80–100)
MCV RBC AUTO: 76.1 FL — LOW (ref 80–100)
MONOCYTES # BLD AUTO: 0.57 K/UL — SIGNIFICANT CHANGE UP (ref 0–0.9)
MONOCYTES # BLD AUTO: 0.77 K/UL — SIGNIFICANT CHANGE UP (ref 0–0.9)
MONOCYTES # BLD AUTO: 0.91 K/UL — HIGH (ref 0–0.9)
MONOCYTES NFR BLD AUTO: 4.5 % — SIGNIFICANT CHANGE UP (ref 2–14)
MONOCYTES NFR BLD AUTO: 6.8 % — SIGNIFICANT CHANGE UP (ref 2–14)
MONOCYTES NFR BLD AUTO: 8.6 % — SIGNIFICANT CHANGE UP (ref 2–14)
NEUTROPHILS # BLD AUTO: 11.37 K/UL — HIGH (ref 1.8–7.4)
NEUTROPHILS # BLD AUTO: 8.56 K/UL — HIGH (ref 1.8–7.4)
NEUTROPHILS # BLD AUTO: 9.13 K/UL — HIGH (ref 1.8–7.4)
NEUTROPHILS NFR BLD AUTO: 80.2 % — HIGH (ref 43–77)
NEUTROPHILS NFR BLD AUTO: 81.1 % — HIGH (ref 43–77)
NEUTROPHILS NFR BLD AUTO: 89.3 % — HIGH (ref 43–77)
NRBC # BLD: 0 /100 WBCS — SIGNIFICANT CHANGE UP
NRBC # FLD: 0 K/UL — SIGNIFICANT CHANGE UP
PLATELET # BLD AUTO: 293 K/UL — SIGNIFICANT CHANGE UP (ref 150–400)
PLATELET # BLD AUTO: 311 K/UL — SIGNIFICANT CHANGE UP (ref 150–400)
PLATELET # BLD AUTO: 328 K/UL — SIGNIFICANT CHANGE UP (ref 150–400)
PLATELET # BLD AUTO: 391 K/UL — SIGNIFICANT CHANGE UP (ref 150–400)
POTASSIUM SERPL-MCNC: 3.7 MMOL/L — SIGNIFICANT CHANGE UP (ref 3.5–5.3)
POTASSIUM SERPL-MCNC: 3.7 MMOL/L — SIGNIFICANT CHANGE UP (ref 3.5–5.3)
POTASSIUM SERPL-MCNC: 4.3 MMOL/L — SIGNIFICANT CHANGE UP (ref 3.5–5.3)
POTASSIUM SERPL-SCNC: 3.7 MMOL/L — SIGNIFICANT CHANGE UP (ref 3.5–5.3)
POTASSIUM SERPL-SCNC: 3.7 MMOL/L — SIGNIFICANT CHANGE UP (ref 3.5–5.3)
POTASSIUM SERPL-SCNC: 4.3 MMOL/L — SIGNIFICANT CHANGE UP (ref 3.5–5.3)
PROT SERPL-MCNC: 5.6 G/DL — LOW (ref 6–8.3)
PROT SERPL-MCNC: 6.6 G/DL — SIGNIFICANT CHANGE UP (ref 6–8.3)
PROT SERPL-MCNC: 6.7 G/DL — SIGNIFICANT CHANGE UP (ref 6–8.3)
PROTHROM AB SERPL-ACNC: 10.2 SEC — LOW (ref 10.5–13.4)
PROTHROM AB SERPL-ACNC: 10.3 SEC — LOW (ref 10.5–13.4)
RBC # BLD: 3.64 M/UL — LOW (ref 3.8–5.2)
RBC # BLD: 3.69 M/UL — LOW (ref 3.8–5.2)
RBC # BLD: 3.95 M/UL — SIGNIFICANT CHANGE UP (ref 3.8–5.2)
RBC # BLD: 4.09 M/UL — SIGNIFICANT CHANGE UP (ref 3.8–5.2)
RBC # FLD: 16.2 % — HIGH (ref 10.3–14.5)
RBC # FLD: 16.3 % — HIGH (ref 10.3–14.5)
RBC # FLD: 16.5 % — HIGH (ref 10.3–14.5)
RBC # FLD: 16.6 % — HIGH (ref 10.3–14.5)
RH IG SCN BLD-IMP: NEGATIVE — SIGNIFICANT CHANGE UP
SODIUM SERPL-SCNC: 131 MMOL/L — LOW (ref 135–145)
SODIUM SERPL-SCNC: 132 MMOL/L — LOW (ref 135–145)
SODIUM SERPL-SCNC: 133 MMOL/L — LOW (ref 135–145)
URATE SERPL-MCNC: 8.6 MG/DL — HIGH (ref 2.5–7)
WBC # BLD: 10.55 K/UL — HIGH (ref 3.8–10.5)
WBC # BLD: 11.39 K/UL — HIGH (ref 3.8–10.5)
WBC # BLD: 12.74 K/UL — HIGH (ref 3.8–10.5)
WBC # BLD: 17.52 K/UL — HIGH (ref 3.8–10.5)
WBC # FLD AUTO: 10.55 K/UL — HIGH (ref 3.8–10.5)
WBC # FLD AUTO: 11.39 K/UL — HIGH (ref 3.8–10.5)
WBC # FLD AUTO: 12.74 K/UL — HIGH (ref 3.8–10.5)
WBC # FLD AUTO: 17.52 K/UL — HIGH (ref 3.8–10.5)

## 2022-05-29 PROCEDURE — 88307 TISSUE EXAM BY PATHOLOGIST: CPT | Mod: 26

## 2022-05-29 PROCEDURE — 59514 CESAREAN DELIVERY ONLY: CPT | Mod: U9,UB,GC

## 2022-05-29 RX ORDER — OXYTOCIN 10 UNIT/ML
333.33 VIAL (ML) INJECTION
Qty: 20 | Refills: 0 | Status: DISCONTINUED | OUTPATIENT
Start: 2022-05-29 | End: 2022-05-29

## 2022-05-29 RX ORDER — OXYCODONE HYDROCHLORIDE 5 MG/1
5 TABLET ORAL ONCE
Refills: 0 | Status: DISCONTINUED | OUTPATIENT
Start: 2022-05-29 | End: 2022-06-01

## 2022-05-29 RX ORDER — KETOROLAC TROMETHAMINE 30 MG/ML
30 SYRINGE (ML) INJECTION EVERY 6 HOURS
Refills: 0 | Status: DISCONTINUED | OUTPATIENT
Start: 2022-05-29 | End: 2022-05-30

## 2022-05-29 RX ORDER — NALOXONE HYDROCHLORIDE 4 MG/.1ML
0.1 SPRAY NASAL
Refills: 0 | Status: DISCONTINUED | OUTPATIENT
Start: 2022-05-29 | End: 2022-06-01

## 2022-05-29 RX ORDER — MORPHINE SULFATE 50 MG/1
2 CAPSULE, EXTENDED RELEASE ORAL ONCE
Refills: 0 | Status: DISCONTINUED | OUTPATIENT
Start: 2022-05-29 | End: 2022-05-30

## 2022-05-29 RX ORDER — OXYCODONE HYDROCHLORIDE 5 MG/1
5 TABLET ORAL
Refills: 0 | Status: COMPLETED | OUTPATIENT
Start: 2022-05-29 | End: 2022-06-05

## 2022-05-29 RX ORDER — ONDANSETRON 8 MG/1
4 TABLET, FILM COATED ORAL EVERY 6 HOURS
Refills: 0 | Status: DISCONTINUED | OUTPATIENT
Start: 2022-05-29 | End: 2022-06-01

## 2022-05-29 RX ORDER — HEPARIN SODIUM 5000 [USP'U]/ML
10000 INJECTION INTRAVENOUS; SUBCUTANEOUS EVERY 12 HOURS
Refills: 0 | Status: DISCONTINUED | OUTPATIENT
Start: 2022-05-29 | End: 2022-06-01

## 2022-05-29 RX ORDER — MAGNESIUM SULFATE 500 MG/ML
1 VIAL (ML) INJECTION
Qty: 40 | Refills: 0 | Status: DISCONTINUED | OUTPATIENT
Start: 2022-05-29 | End: 2022-05-30

## 2022-05-29 RX ORDER — DEXAMETHASONE 0.5 MG/5ML
4 ELIXIR ORAL EVERY 6 HOURS
Refills: 0 | Status: DISCONTINUED | OUTPATIENT
Start: 2022-05-29 | End: 2022-06-01

## 2022-05-29 RX ORDER — ACETAMINOPHEN 500 MG
975 TABLET ORAL
Refills: 0 | Status: DISCONTINUED | OUTPATIENT
Start: 2022-05-29 | End: 2022-06-01

## 2022-05-29 RX ORDER — DIPHENHYDRAMINE HCL 50 MG
25 CAPSULE ORAL EVERY 6 HOURS
Refills: 0 | Status: DISCONTINUED | OUTPATIENT
Start: 2022-05-29 | End: 2022-06-01

## 2022-05-29 RX ORDER — OXYTOCIN 10 UNIT/ML
333.33 VIAL (ML) INJECTION
Qty: 20 | Refills: 0 | Status: DISCONTINUED | OUTPATIENT
Start: 2022-05-29 | End: 2022-05-30

## 2022-05-29 RX ORDER — LANOLIN
1 OINTMENT (GRAM) TOPICAL EVERY 6 HOURS
Refills: 0 | Status: DISCONTINUED | OUTPATIENT
Start: 2022-05-29 | End: 2022-06-01

## 2022-05-29 RX ORDER — MAGNESIUM HYDROXIDE 400 MG/1
30 TABLET, CHEWABLE ORAL
Refills: 0 | Status: DISCONTINUED | OUTPATIENT
Start: 2022-05-29 | End: 2022-06-01

## 2022-05-29 RX ORDER — SIMETHICONE 80 MG/1
80 TABLET, CHEWABLE ORAL EVERY 4 HOURS
Refills: 0 | Status: DISCONTINUED | OUTPATIENT
Start: 2022-05-29 | End: 2022-06-01

## 2022-05-29 RX ORDER — MAGNESIUM SULFATE 500 MG/ML
1.5 VIAL (ML) INJECTION
Qty: 40 | Refills: 0 | Status: DISCONTINUED | OUTPATIENT
Start: 2022-05-29 | End: 2022-05-29

## 2022-05-29 RX ORDER — SODIUM CHLORIDE 9 MG/ML
500 INJECTION, SOLUTION INTRAVENOUS ONCE
Refills: 0 | Status: DISCONTINUED | OUTPATIENT
Start: 2022-05-29 | End: 2022-05-29

## 2022-05-29 RX ORDER — TETANUS TOXOID, REDUCED DIPHTHERIA TOXOID AND ACELLULAR PERTUSSIS VACCINE, ADSORBED 5; 2.5; 8; 8; 2.5 [IU]/.5ML; [IU]/.5ML; UG/.5ML; UG/.5ML; UG/.5ML
0.5 SUSPENSION INTRAMUSCULAR ONCE
Refills: 0 | Status: DISCONTINUED | OUTPATIENT
Start: 2022-05-29 | End: 2022-06-01

## 2022-05-29 RX ORDER — SODIUM CHLORIDE 9 MG/ML
1000 INJECTION, SOLUTION INTRAVENOUS
Refills: 0 | Status: DISCONTINUED | OUTPATIENT
Start: 2022-05-29 | End: 2022-05-30

## 2022-05-29 RX ORDER — SODIUM CHLORIDE 9 MG/ML
500 INJECTION, SOLUTION INTRAVENOUS ONCE
Refills: 0 | Status: COMPLETED | OUTPATIENT
Start: 2022-05-29 | End: 2022-05-29

## 2022-05-29 RX ORDER — IBUPROFEN 200 MG
600 TABLET ORAL EVERY 6 HOURS
Refills: 0 | Status: COMPLETED | OUTPATIENT
Start: 2022-05-29 | End: 2023-04-27

## 2022-05-29 RX ADMIN — Medication 30 MILLIGRAM(S): at 09:08

## 2022-05-29 RX ADMIN — Medication 37.5 GM/HR: at 07:34

## 2022-05-29 RX ADMIN — SODIUM CHLORIDE 75 MILLILITER(S): 9 INJECTION, SOLUTION INTRAVENOUS at 23:10

## 2022-05-29 RX ADMIN — Medication 25 GM/HR: at 23:41

## 2022-05-29 RX ADMIN — SODIUM CHLORIDE 1000 MILLILITER(S): 9 INJECTION, SOLUTION INTRAVENOUS at 18:00

## 2022-05-29 NOTE — OB PROVIDER DELIVERY SUMMARY - NSPROVIDERDELIVERYNOTE_OBGYN_ALL_OB_FT
IVF 1000      Viable infant, cephalic ROP  Grossly normal uterus, tubes, ovaries  Fetus delivered via vacuum extraction  Hysterotomy closed in two layers

## 2022-05-29 NOTE — OB NEONATOLOGY/PEDIATRICIAN DELIVERY SUMMARY - BABY A: APGAR 5 MIN HEART RATE, DELIVERY
Recommended and instructed patient on hot compresses with a microwaveable eye mask and lid scrubs, BID, OU. (2) more than 100 beats/min

## 2022-05-29 NOTE — OB RN DELIVERY SUMMARY - NS_SEPSISRSKCALC_OBGYN_ALL_OB_FT
EOS calculated successfully. EOS Risk Factor: 0.5/1000 live births ([facility] national incidence); GA=38w5d; Temp=98.6; ROM=24.2; GBS='Unknown'; Antibiotics='No antibiotics or any antibiotics < 2 hrs prior to birth'

## 2022-05-29 NOTE — OB RN INTRAOPERATIVE NOTE - NSSELHIDDEN_OBGYN_ALL_OB_FT
[NS_DeliveryAttending1_OBGYN_ALL_OB_FT:[id number]==],[NS_DeliveryAssist1_OBGYN_ALL_OB_FT:[id number]],[NS_DeliveryRN_OBGYN_ALL_OB_FT:[id number]==],[NS_CirculateRN2_OBGYN_ALL_OB_FT:[id number]]

## 2022-05-29 NOTE — OB PROVIDER DELIVERY SUMMARY - NSSELHIDDEN_OBGYN_ALL_OB_FT
[NS_DeliveryAttending1_OBGYN_ALL_OB_FT:[id number]==],[NS_DeliveryAssist1_OBGYN_ALL_OB_FT:[id number]],[NS_DeliveryRN_OBGYN_ALL_OB_FT:[id number]==],[NS_CirculateRN2_OBGYN_ALL_OB_FT:[id number]=]

## 2022-05-29 NOTE — OB PROVIDER LABOR PROGRESS NOTE - ASSESSMENT
27y  at 38w5d IOL sPEC ruptured and on pitocin since 2p yesterday (18h). Patient has made minimal change since the last cervical exam; however increased effacement and contraction pattern now functional, adequate. Patient is highly motivated for vaginal delivery, understands risks of prolonging pregnancy in the setting of worsening PEC. She understands that at this time she is relatively remote from delivery; if labs indicate end organ damage,  delivery may be recommended.     - labor: c/w pitocin, uptitrate to adequacy. Reassess at a short interval. IUPC now bloody, avoid amnioinfusion--d/w w nursing  - fetus: cat 1 FHT, cont toco/efm  - gbs: neg, no indication for ppx  - gdm: euglycemic  - pec: labs pending, BPs well controlled    d/w Dr. Marysol Brumfield PGY4

## 2022-05-29 NOTE — OB RN DELIVERY SUMMARY - NS_GESTAGEATBIRTHA_OBGYN_ALL_OB_FT
38w5d Rituxan Pregnancy And Lactation Text: This medication is Pregnancy Category C and it isn't know if it is safe during pregnancy. It is unknown if this medication is excreted in breast milk but similar antibodies are known to be excreted.

## 2022-05-29 NOTE — OB PROVIDER LABOR PROGRESS NOTE - NS_SUBJECTIVE/OBJECTIVE_OBGYN_ALL_OB_FT
PGY1 Labor & Delivery Progress Note     Pt seen & examined at bedside for  update to labor curve.  SVE 5/60/-3  IUPC placed for better titration of pitocin  While at bedside, 5 minute deceleration noted to 60s.  Patient re-examined, no cord prolapse noted.   Terb x1 given.   Patient transferred to OR, and FHR ntoed to be back to baseline of 120.  T(C): 36.7 (05-28-22 @ 18:30), Max: 36.8 (05-28-22 @ 17:00)  HR: 95 (05-28-22 @ 21:12) (61 - 107)  BP: 133/64 (05-28-22 @ 20:36) (92/42 - 183/77)  RR: --  SpO2: 100% (05-28-22 @ 21:12) (87% - 100%)
Patient seen at bedside for interval reassessment, to discuss plan of care.  Patient denies headache, blurry vision, shortness of breath, epigastric pain, nausea, or vomiting.     Patient counseled re: signs, symptoms, and natural disease course of PEC and sPEC.   I counseled her that she will continue to be treated with IV MgSO4 to decrease risk of seizure as well as antihypertensives as needed. She understands the need for these interventions and agrees with plan.  I additionally counseled her that antihypertensives may be required on discharge. If so, these medications will be downtitrated and likely ultimately discontinued with her outpatient provider.    Patient aware of need to monitor and report any symptoms such as HA, blurry vision, chest pain, shortness of breath, epigastric pain, and/or nausea/vomiting as these may be signs of worsening disease.   Patient understands the need for outpatient blood pressure monitoring--All questions answered.     I counseled the patient that she has been ruptured and on pitocin for 24h, no labs drawn for 24h due to poor access. A-stick at bedside with anesthesia assistance for full set of labs.
VE to evaluate progress in labor

## 2022-05-29 NOTE — OB NEONATOLOGY/PEDIATRICIAN DELIVERY SUMMARY - NSPEDSNEONOTESA_OBGYN_ALL_OB_FT
38W5D GA infant delivered by  for failure of induction to a 26 y/o  A- ((S/P Rhogam on 3/15) mother with GDMA1 and anxiety ( no meds). Prenatal labs: GBS neg (), HIV neg, RPR non-reactive, HBsAg neg, Rubella immune. COVID positive () no symptoms. Induction of labor for severe preeclampsia. Received Procardia and MgSO4. AROM on  at 1415 ( 24hrs PTD) to clear fluid (did not receive antibiotics PTD). Maternal Tmax 37.0. Infant delivered cephalic, vacuum assisted (no pop off) with spontaneous cry but decreased tone. Dried, bulb suctioned and positioned. Apgars 7/9. Pulse ox % RA and -150s. Mother plans to breastfeed and would like her infant to received Hep B vaccine and get circumcised. EOS 0.21

## 2022-05-29 NOTE — OB RN DELIVERY SUMMARY - NSSELHIDDEN_OBGYN_ALL_OB_FT
[NS_DeliveryAttending1_OBGYN_ALL_OB_FT:[id number]==],[NS_DeliveryAssist1_OBGYN_ALL_OB_FT:[id number]],[NS_DeliveryRN_OBGYN_ALL_OB_FT:[id number]],[NS_CirculateRN2_OBGYN_ALL_OB_FT:[email or web address]=]

## 2022-05-29 NOTE — OB NEONATOLOGY/PEDIATRICIAN DELIVERY SUMMARY - NSPHYSICALEXAMDETAILSA_OBGYN_ALL_OB_FT
Abdomen appeared flat. Infant breathing comfortably and not in distress, sats % on room air. Lungs with good air entry bilaterally

## 2022-05-29 NOTE — CHART NOTE - NSCHARTNOTEFT_GEN_A_CORE
OB Attending Note    26 y/o P0 at 38 weeks admitted for IOL for sPEC.  Patient currently 5cm dilated, ruptured on pitocin for >18 hours  Labs currently stable  Fetal status overall reassuring  Plan of care discussed with patient and plan is to proceed with primary C/S for failed IOL  Patient in agreement with plan - written informed consent obtained  Anesthesia and nursing made aware    ALIE Thakkar MD

## 2022-05-30 ENCOUNTER — TRANSCRIPTION ENCOUNTER (OUTPATIENT)
Age: 27
End: 2022-05-30

## 2022-05-30 LAB
ALBUMIN SERPL ELPH-MCNC: 2.4 G/DL — LOW (ref 3.3–5)
ALP SERPL-CCNC: 119 U/L — SIGNIFICANT CHANGE UP (ref 40–120)
ALT FLD-CCNC: 5 U/L — SIGNIFICANT CHANGE UP (ref 4–33)
ANION GAP SERPL CALC-SCNC: 12 MMOL/L — SIGNIFICANT CHANGE UP (ref 7–14)
APTT BLD: 27.7 SEC — SIGNIFICANT CHANGE UP (ref 27–36.3)
AST SERPL-CCNC: 17 U/L — SIGNIFICANT CHANGE UP (ref 4–32)
BASOPHILS # BLD AUTO: 0.02 K/UL — SIGNIFICANT CHANGE UP (ref 0–0.2)
BASOPHILS # BLD AUTO: 0.03 K/UL — SIGNIFICANT CHANGE UP (ref 0–0.2)
BASOPHILS NFR BLD AUTO: 0.1 % — SIGNIFICANT CHANGE UP (ref 0–2)
BASOPHILS NFR BLD AUTO: 0.2 % — SIGNIFICANT CHANGE UP (ref 0–2)
BILIRUB SERPL-MCNC: <0.2 MG/DL — SIGNIFICANT CHANGE UP (ref 0.2–1.2)
BUN SERPL-MCNC: 7 MG/DL — SIGNIFICANT CHANGE UP (ref 7–23)
CALCIUM SERPL-MCNC: 7.2 MG/DL — LOW (ref 8.4–10.5)
CHLORIDE SERPL-SCNC: 102 MMOL/L — SIGNIFICANT CHANGE UP (ref 98–107)
CO2 SERPL-SCNC: 18 MMOL/L — LOW (ref 22–31)
CREAT SERPL-MCNC: 0.69 MG/DL — SIGNIFICANT CHANGE UP (ref 0.5–1.3)
EGFR: 123 ML/MIN/1.73M2 — SIGNIFICANT CHANGE UP
EOSINOPHIL # BLD AUTO: 0 K/UL — SIGNIFICANT CHANGE UP (ref 0–0.5)
EOSINOPHIL # BLD AUTO: 0 K/UL — SIGNIFICANT CHANGE UP (ref 0–0.5)
EOSINOPHIL NFR BLD AUTO: 0 % — SIGNIFICANT CHANGE UP (ref 0–6)
EOSINOPHIL NFR BLD AUTO: 0 % — SIGNIFICANT CHANGE UP (ref 0–6)
FIBRINOGEN PPP-MCNC: 909 MG/DL — HIGH (ref 330–520)
GLUCOSE SERPL-MCNC: 128 MG/DL — HIGH (ref 70–99)
HCT VFR BLD CALC: 25.2 % — LOW (ref 34.5–45)
HGB BLD-MCNC: 7.7 G/DL — LOW (ref 11.5–15.5)
IANC: 15.56 K/UL — HIGH (ref 1.8–7.4)
IMM GRANULOCYTES NFR BLD AUTO: 0.6 % — SIGNIFICANT CHANGE UP (ref 0–1.5)
IMM GRANULOCYTES NFR BLD AUTO: 0.6 % — SIGNIFICANT CHANGE UP (ref 0–1.5)
INR BLD: 0.93 RATIO — SIGNIFICANT CHANGE UP (ref 0.88–1.16)
LDH SERPL L TO P-CCNC: 303 U/L — HIGH (ref 135–225)
LYMPHOCYTES # BLD AUTO: 0.45 K/UL — LOW (ref 1–3.3)
LYMPHOCYTES # BLD AUTO: 0.73 K/UL — LOW (ref 1–3.3)
LYMPHOCYTES # BLD AUTO: 2.6 % — LOW (ref 13–44)
LYMPHOCYTES # BLD AUTO: 4.3 % — LOW (ref 13–44)
MAGNESIUM SERPL-MCNC: 5.4 MG/DL — HIGH (ref 1.6–2.6)
MCHC RBC-ENTMCNC: 20.8 PG — LOW (ref 27–34)
MCHC RBC-ENTMCNC: 30.6 GM/DL — LOW (ref 32–36)
MCV RBC AUTO: 68.1 FL — LOW (ref 80–100)
MONOCYTES # BLD AUTO: 0.41 K/UL — SIGNIFICANT CHANGE UP (ref 0–0.9)
MONOCYTES # BLD AUTO: 0.58 K/UL — SIGNIFICANT CHANGE UP (ref 0–0.9)
MONOCYTES NFR BLD AUTO: 2.3 % — SIGNIFICANT CHANGE UP (ref 2–14)
MONOCYTES NFR BLD AUTO: 3.4 % — SIGNIFICANT CHANGE UP (ref 2–14)
NEUTROPHILS # BLD AUTO: 15.56 K/UL — HIGH (ref 1.8–7.4)
NEUTROPHILS # BLD AUTO: 16.53 K/UL — HIGH (ref 1.8–7.4)
NEUTROPHILS NFR BLD AUTO: 91.6 % — HIGH (ref 43–77)
NEUTROPHILS NFR BLD AUTO: 94.3 % — HIGH (ref 43–77)
NRBC # BLD: 0 /100 WBCS — SIGNIFICANT CHANGE UP
NRBC # BLD: 0 /100 WBCS — SIGNIFICANT CHANGE UP
NRBC # FLD: 0 K/UL — SIGNIFICANT CHANGE UP
NRBC # FLD: 0 K/UL — SIGNIFICANT CHANGE UP
PLATELET # BLD AUTO: 329 K/UL — SIGNIFICANT CHANGE UP (ref 150–400)
POTASSIUM SERPL-MCNC: 4 MMOL/L — SIGNIFICANT CHANGE UP (ref 3.5–5.3)
POTASSIUM SERPL-SCNC: 4 MMOL/L — SIGNIFICANT CHANGE UP (ref 3.5–5.3)
PROT SERPL-MCNC: 5.8 G/DL — LOW (ref 6–8.3)
PROTHROM AB SERPL-ACNC: 10.8 SEC — SIGNIFICANT CHANGE UP (ref 10.5–13.4)
RBC # BLD: 3.7 M/UL — LOW (ref 3.8–5.2)
RBC # FLD: 16.2 % — HIGH (ref 10.3–14.5)
SODIUM SERPL-SCNC: 132 MMOL/L — LOW (ref 135–145)
URATE SERPL-MCNC: 8.7 MG/DL — HIGH (ref 2.5–7)
WBC # BLD: 16.99 K/UL — HIGH (ref 3.8–10.5)
WBC # FLD AUTO: 16.99 K/UL — HIGH (ref 3.8–10.5)

## 2022-05-30 RX ORDER — IBUPROFEN 200 MG
600 TABLET ORAL EVERY 6 HOURS
Refills: 0 | Status: DISCONTINUED | OUTPATIENT
Start: 2022-05-30 | End: 2022-06-01

## 2022-05-30 RX ORDER — ASCORBIC ACID 60 MG
500 TABLET,CHEWABLE ORAL DAILY
Refills: 0 | Status: DISCONTINUED | OUTPATIENT
Start: 2022-05-30 | End: 2022-06-01

## 2022-05-30 RX ORDER — FERROUS SULFATE 325(65) MG
325 TABLET ORAL DAILY
Refills: 0 | Status: DISCONTINUED | OUTPATIENT
Start: 2022-05-30 | End: 2022-06-01

## 2022-05-30 RX ORDER — NIFEDIPINE 30 MG
1 TABLET, EXTENDED RELEASE 24 HR ORAL
Qty: 30 | Refills: 0
Start: 2022-05-30 | End: 2022-06-28

## 2022-05-30 RX ORDER — NIFEDIPINE 30 MG
30 TABLET, EXTENDED RELEASE 24 HR ORAL DAILY
Refills: 0 | Status: DISCONTINUED | OUTPATIENT
Start: 2022-05-30 | End: 2022-06-01

## 2022-05-30 RX ADMIN — SIMETHICONE 80 MILLIGRAM(S): 80 TABLET, CHEWABLE ORAL at 14:24

## 2022-05-30 RX ADMIN — SIMETHICONE 80 MILLIGRAM(S): 80 TABLET, CHEWABLE ORAL at 23:42

## 2022-05-30 RX ADMIN — Medication 975 MILLIGRAM(S): at 17:18

## 2022-05-30 RX ADMIN — Medication 500 MILLIGRAM(S): at 14:17

## 2022-05-30 RX ADMIN — Medication 30 MILLIGRAM(S): at 01:15

## 2022-05-30 RX ADMIN — Medication 30 MILLIGRAM(S): at 14:17

## 2022-05-30 RX ADMIN — MAGNESIUM HYDROXIDE 30 MILLILITER(S): 400 TABLET, CHEWABLE ORAL at 16:42

## 2022-05-30 RX ADMIN — Medication 975 MILLIGRAM(S): at 10:40

## 2022-05-30 RX ADMIN — HEPARIN SODIUM 10000 UNIT(S): 5000 INJECTION INTRAVENOUS; SUBCUTANEOUS at 14:17

## 2022-05-30 RX ADMIN — Medication 30 MILLIGRAM(S): at 01:03

## 2022-05-30 RX ADMIN — HEPARIN SODIUM 10000 UNIT(S): 5000 INJECTION INTRAVENOUS; SUBCUTANEOUS at 01:02

## 2022-05-30 RX ADMIN — Medication 325 MILLIGRAM(S): at 14:17

## 2022-05-30 RX ADMIN — Medication 975 MILLIGRAM(S): at 22:03

## 2022-05-30 RX ADMIN — Medication 975 MILLIGRAM(S): at 16:42

## 2022-05-30 RX ADMIN — Medication 30 MILLIGRAM(S): at 06:20

## 2022-05-30 RX ADMIN — Medication 975 MILLIGRAM(S): at 22:33

## 2022-05-30 RX ADMIN — Medication 975 MILLIGRAM(S): at 09:58

## 2022-05-30 RX ADMIN — Medication 975 MILLIGRAM(S): at 03:27

## 2022-05-30 RX ADMIN — Medication 25 GM/HR: at 07:20

## 2022-05-30 RX ADMIN — Medication 30 MILLIGRAM(S): at 06:05

## 2022-05-30 RX ADMIN — Medication 30 MILLIGRAM(S): at 15:03

## 2022-05-30 RX ADMIN — Medication 30 MILLIGRAM(S): at 09:58

## 2022-05-30 RX ADMIN — Medication 600 MILLIGRAM(S): at 23:42

## 2022-05-30 RX ADMIN — SIMETHICONE 80 MILLIGRAM(S): 80 TABLET, CHEWABLE ORAL at 09:58

## 2022-05-30 RX ADMIN — Medication 975 MILLIGRAM(S): at 04:15

## 2022-05-30 NOTE — DISCHARGE NOTE OB - PROVIDER TOKENS
FREE:[LAST:[[facility]],PHONE:[(   )    -],FAX:[(   )    -],ADDRESS:[[facility]  [address] [place] [address]  Ambulatory Care Unit  Oncology [facility], Level C  [phone]]]

## 2022-05-30 NOTE — DISCHARGE NOTE OB - NS MD DC FALL RISK RISK
For information on Fall & Injury Prevention, visit: [email or web address] OR  [email or web address] OR  https://www.cdc.gov/steadi/patient.html

## 2022-05-30 NOTE — DISCHARGE NOTE OB - HOSPITAL COURSE
Patient had uncomplicated low transverse  section for arrest of labor. Patient was also diagnosed with preeclampsia with severe features during her admission.  Please see operative note for details.  During postpartum course patient's vitals were stable, vaginal bleeding appropriate, and pain well controlled.  Post operation day one hematocrit was appropriate.  On day of discharge patient was ambulating, her pain controlled with oral medications, had adequate oral intake, and was voiding freely.  Discharge instructions and precautions were given.  Will return to clinic in 2 weeks for incision check.  Patient declines postpartum birth control plan at this time. Patient is to have a pap smear at her postoperative appointment 6 weeks postpartum per clinic notes. Patient had uncomplicated low transverse  section for arrest of labor. Patient was also diagnosed with preeclampsia with severe features during her admission.  Please see operative note for details.  During postpartum course patient's vitals were stable, vaginal bleeding appropriate, and pain well controlled.  Post operation day one hematocrit was appropriate.  On POD#3 patient complained of a persistent headache that was postural in nature. Patient was seen by anesthesia who recommended symptomatic management (i.e. caffeine, hydration, abdominal binder). It was recommended for patient to get head CT to r/o brain bleed or stroke; however, patient stated that headache resolved and that she would prefer to go home. Patient signed refusal of treatment paperwork. On day of discharge patient was ambulating, her pain controlled with oral medications, had adequate oral intake, and was voiding freely.  Discharge instructions and precautions were given.  Will return to clinic in 2 weeks for incision check.  Patient declines postpartum birth control plan at this time. Patient instructed to continue taking her Procardia 30xL daily. Patient will follow up in clinic in 48-72 hours for a blood pressure check. Patient will follow-up in clinic in two weeks for an incision check. Patient is to have a pap smear at her postoperative appointment 6 weeks postpartum per clinic notes. Patient had uncomplicated low transverse  section for arrest of labor. Patient was also diagnosed with preeclampsia with severe features during her admission.  Please see operative note for details.  During postpartum course patient's vitals were stable, vaginal bleeding appropriate, and pain well controlled.  Post operation day one hematocrit was appropriate.  On POD#3 patient complained of a persistent headache that was postural in nature. Patient was seen by anesthesia who recommended symptomatic management (i.e. caffeine, hydration, abdominal binder) as they thought the headache was likely post-dural. It was recommended for patient to get head CT of head without contrast. Patient signed refusal of treatment paperwork. On day of discharge patient was ambulating, her pain controlled with oral medications, had adequate oral intake, and was voiding freely.  Discharge instructions and precautions were given.  Will return to clinic in 2 weeks for incision check.  Patient declines postpartum birth control plan at this time. Patient instructed to continue taking her Procardia 30xL daily. Patient will follow up in clinic in 48-72 hours for a blood pressure check. Patient will follow-up in clinic in two weeks for an incision check. Patient is to have a pap smear at her postoperative appointment 6 weeks postpartum per clinic notes.

## 2022-05-30 NOTE — DISCHARGE NOTE OB - PLAN OF CARE
During your admission to the hospital you were diagnosed with pre-eclampsia with severe features. You were treated with magnesium for seizure prophylaxis and started on Procardia 30mg XL daily for blood pressure management. Please continue this medication as prescribed until you follow up in the clinic and receive further recommendations. Follow up in the clinic this week for a blood pressure check.  Check your blood pressure three times a day with the blood pressure cuff sent to your pharmacy and keep a log of all values. Bring this to your appointment. If your blood pressure is >140/90, call the clinic. If you experience headache not relieved with Tylenol, visual changes, or epigastric pain, or your blood pressure is above 160/110 call the clinic and come to the hospital to be evaluated immediately. Please follow up for a repeat glucose tolerance test at your 6 week post-partum appointment. Patient seen and evaluated by anesthesia who recommended caffeine, hydration, and abdominal binder. Follow-up with your doctor in 48-72 hours to check your blood pressure.  A prescription was handed to you for a blood pressure cuff to monitor your blood pressures at home. Call your doctor if your blood pressure is greater than or equal to 160 systolic (top number) of 110 diastolic (bottom number) or if you experience a headache unrelieved by OTC medications, blurred vision, or difficulty breathing.

## 2022-05-30 NOTE — DISCHARGE NOTE OB - MEDICATION SUMMARY - MEDICATIONS TO TAKE
I will START or STAY ON the medications listed below when I get home from the hospital:    large blood pressure cuff  -- 1 kit by transdermal patch 3 times a day MDD:3 times daily  -- Indication: For Preeclampsia, third trimester    Procardia XL 30 mg oral tablet, extended release  -- 1 tab(s) by mouth once a day MDD:1 tablet  -- Avoid grapefruit and grapefruit juice while taking this medication.  It is very important that you take or use this exactly as directed.  Do not skip doses or discontinue unless directed by your doctor.  Some non-prescription drugs may aggravate your condition.  Read all labels carefully.  If a warning appears, check with your doctor before taking.  Swallow whole.  Do not crush.    -- Indication: For Preeclampsia, third trimester    PNV Prenatal oral tablet  -- 1 tab(s) by mouth once a day  -- Indication: For Postpartum recovery   I will START or STAY ON the medications listed below when I get home from the hospital:    large blood pressure cuff  -- 1 kit by transdermal patch 3 times a day MDD:3 times daily  -- Indication: For Preeclampsia, third trimester    ibuprofen 600 mg oral tablet  -- 1 tab(s) by mouth every 6 hours  -- Indication: For for pain    acetaminophen 325 mg oral tablet  -- 3 tab(s) by mouth every 6 hours, As Needed  -- Indication: For for pain    oxyCODONE 5 mg oral tablet  -- 1 tab(s) by mouth every 6 hours, As Needed -for severe pain MDD:4 pills   -- Caution federal law prohibits the transfer of this drug to any person other  than the person for whom it was prescribed.  It is very important that you take or use this exactly as directed.  Do not skip doses or discontinue unless directed by your doctor.  May cause drowsiness or dizziness.  This prescription cannot be refilled.  Using more of this medication than prescribed may cause serious breathing problems.    -- Indication: For for severe pain    Procardia XL 30 mg oral tablet, extended release  -- 1 tab(s) by mouth once a day   -- Avoid grapefruit and grapefruit juice while taking this medication.  It is very important that you take or use this exactly as directed.  Do not skip doses or discontinue unless directed by your doctor.  Some non-prescription drugs may aggravate your condition.  Read all labels carefully.  If a warning appears, check with your doctor before taking.  Swallow whole.  Do not crush.    -- Indication: For blood pressure    Procardia XL 30 mg oral tablet, extended release  -- 1 tab(s) by mouth once a day MDD:1 tablet  -- Avoid grapefruit and grapefruit juice while taking this medication.  It is very important that you take or use this exactly as directed.  Do not skip doses or discontinue unless directed by your doctor.  Some non-prescription drugs may aggravate your condition.  Read all labels carefully.  If a warning appears, check with your doctor before taking.  Swallow whole.  Do not crush.    -- Indication: For Preeclampsia, third trimester    PNV Prenatal oral tablet  -- 1 tab(s) by mouth once a day  -- Indication: For Postpartum recovery

## 2022-05-30 NOTE — DISCHARGE NOTE OB - CARE PLAN
1 Principal Discharge DX:	Severe preeclampsia  Assessment and plan of treatment:	During your admission to the hospital you were diagnosed with pre-eclampsia with severe features. You were treated with magnesium for seizure prophylaxis and started on Procardia 30mg XL daily for blood pressure management. Please continue this medication as prescribed until you follow up in the clinic and receive further recommendations. Follow up in the clinic this week for a blood pressure check.  Check your blood pressure three times a day with the blood pressure cuff sent to your pharmacy and keep a log of all values. Bring this to your appointment. If your blood pressure is >140/90, call the clinic. If you experience headache not relieved with Tylenol, visual changes, or epigastric pain, or your blood pressure is above 160/110 call the clinic and come to the hospital to be evaluated immediately.  Secondary Diagnosis:	Gestational diabetes mellitus, class A1  Assessment and plan of treatment:	Please follow up for a repeat glucose tolerance test at your 6 week post-partum appointment.   Principal Discharge DX:	 delivery delivered  Assessment and plan of treatment:	During your admission to the hospital you were diagnosed with pre-eclampsia with severe features. You were treated with magnesium for seizure prophylaxis and started on Procardia 30mg XL daily for blood pressure management. Please continue this medication as prescribed until you follow up in the clinic and receive further recommendations. Follow up in the clinic this week for a blood pressure check.  Check your blood pressure three times a day with the blood pressure cuff sent to your pharmacy and keep a log of all values. Bring this to your appointment. If your blood pressure is >140/90, call the clinic. If you experience headache not relieved with Tylenol, visual changes, or epigastric pain, or your blood pressure is above 160/110 call the clinic and come to the hospital to be evaluated immediately.  Secondary Diagnosis:	Gestational diabetes mellitus, class A1  Assessment and plan of treatment:	Please follow up for a repeat glucose tolerance test at your 6 week post-partum appointment.  Secondary Diagnosis:	Postdural puncture headache  Assessment and plan of treatment:	Patient seen and evaluated by anesthesia who recommended caffeine, hydration, and abdominal binder.   Principal Discharge DX:	 delivery delivered  Assessment and plan of treatment:	During your admission to the hospital you were diagnosed with pre-eclampsia with severe features. You were treated with magnesium for seizure prophylaxis and started on Procardia 30mg XL daily for blood pressure management. Please continue this medication as prescribed until you follow up in the clinic and receive further recommendations. Follow up in the clinic this week for a blood pressure check.  Check your blood pressure three times a day with the blood pressure cuff sent to your pharmacy and keep a log of all values. Bring this to your appointment. If your blood pressure is >140/90, call the clinic. If you experience headache not relieved with Tylenol, visual changes, or epigastric pain, or your blood pressure is above 160/110 call the clinic and come to the hospital to be evaluated immediately.  Secondary Diagnosis:	Gestational diabetes mellitus, class A1  Assessment and plan of treatment:	Please follow up for a repeat glucose tolerance test at your 6 week post-partum appointment.  Secondary Diagnosis:	Postdural puncture headache  Assessment and plan of treatment:	Patient seen and evaluated by anesthesia who recommended caffeine, hydration, and abdominal binder.  Secondary Diagnosis:	Pre-eclampsia, severe  Assessment and plan of treatment:	Follow-up with your doctor in 48-72 hours to check your blood pressure.  A prescription was handed to you for a blood pressure cuff to monitor your blood pressures at home. Call your doctor if your blood pressure is greater than or equal to 160 systolic (top number) of 110 diastolic (bottom number) or if you experience a headache unrelieved by OTC medications, blurred vision, or difficulty breathing.

## 2022-05-30 NOTE — DISCHARGE NOTE OB - FINDINGS/TREATMENT
pre-eclampsia with severe features  gestation diabetes type 1 pre-eclampsia with severe features  gestation diabetes type 1  post dural headache

## 2022-05-30 NOTE — DISCHARGE NOTE OB - COMMUNITY RESOURCE NAME:
Patient to call and schedule a visit 2 weeks after delivery date to check the  incision at [facility] Ambulatory care Unit-Oncology Building, Level C,  [phone].

## 2022-05-30 NOTE — DISCHARGE NOTE OB - PATIENT PORTAL LINK FT
Problem: Suicide/Homicide (Adult/Pediatric)  Goal: *STG: Remains safe in hospital  Patient to remain safe every day while hospitalized. Outcome: Progressing Towards Goal  Patient is progressing as evidence by demonstrating no behaviors of self harm or harm to others during this nurse's shift. Patient continues to voice ability to contract for safety if allowed to return to patient room unattended. Dr has continued order for day area restriction. Goal: *STG/LTG: Complies with medication therapy  Patient to take medications as prescribed every day while hospitalized. Outcome: Progressing Towards Goal  Patient is progressing as evidence by taking all medications on schedule and as prescribed during this shift. Patient has not required PRN medications at this time. Comments: Patient has required many redirections throughout this shift due to loud volume and interruptive behaviors. Patient has been compliant with day area restrictions and has not demonstrated any harmful behaviors to self or others. Patient continues to doubt self if allowed to return to room unattended. Patient has eaten all meals and snacks and has not asked others for food and/or taken food off other's trays. Patient consistently wears non-skid footwear while ambulating and when begin to run in day area/recreation area, has been easily redirectable to [employer] don't run. \"  Patient appeared to enjoy group with  and asked  to return and provide communion for her.  and patient were given privacy during communion. Patient has attended all groups and activities and although \"acts\" younger than actual age, has demonstrated \"excitement\" for interactions with peers and staff. Patient denies pain or other medical complaints at this time. Will continue to monitor and provide safe and therapeutic interventions as needed and as appropriate. You can access the FollowMyHealth Patient Portal offered by [facility] by registering at the following website: [email or web address]. By joining [facility]’s FollowMyHealth portal, you will also be able to view your health information using other applications (apps) compatible with our system.

## 2022-05-30 NOTE — DISCHARGE NOTE OB - COMMUNITY RESOURCE CONTACT NUMBER:
Patient to call and schedule baby's first visit appointment at [facility] Division of General Pediatrics [address], [address], [place]  [phone] so that baby is evaluated by pediatrician 1 to 2 days after hospital discharge.

## 2022-05-30 NOTE — DISCHARGE NOTE OB - CARE PROVIDER_API CALL
[facility],   [facility]  [address] [place] [address]  Ambulatory Care Unit  Oncology [facility], Level C  [phone]  Phone: (   )    -  Fax: (   )    -  Follow Up Time:

## 2022-05-30 NOTE — DISCHARGE NOTE OB - ADDITIONAL INSTRUCTIONS
After discharge, please stay on pelvic rest for 6 weeks, meaning no sexual intercourse, no tampons and no douching.  No driving for 2 weeks as women can loose a lot of blood during delivery and there is a possibility of being lightheaded/fainting.  No lifting objects heavier than baby for two weeks.  Expect to have vaginal bleeding/spotting for up to six weeks.  The bleeding should get lighter and more white/light brown with time.  For bleeding soaking more than a pad an hour or passing clots greater than the size of your fist, come in to the emergency department.    Follow up in clinic in 2 weeks for incision check.  Call clinic for noticeable increase in redness or swelling at incision, discharge from incision, or opening of skin at incision site. After discharge, please stay on pelvic rest for 6 weeks, meaning no sexual intercourse, no tampons and no douching.  No driving for 2 weeks as women can loose a lot of blood during delivery and there is a possibility of being lightheaded/fainting.  No lifting objects heavier than baby for two weeks.  Expect to have vaginal bleeding/spotting for up to six weeks.  The bleeding should get lighter and more white/light brown with time.  For bleeding soaking more than a pad an hour or passing clots greater than the size of your fist, come in to the emergency department.    Follow up in clinic in 2 weeks for incision check.  Call clinic for noticeable increase in redness or swelling at incision, discharge from incision, or opening of skin at incision site. At your 6 week visit you need to obtain a pap smear as one was not performed during pregnancy.

## 2022-05-31 DIAGNOSIS — O24.419 GESTATIONAL DIABETES MELLITUS IN PREGNANCY, UNSPECIFIED CONTROL: ICD-10-CM

## 2022-05-31 LAB
A1C WITH ESTIMATED AVERAGE GLUCOSE RESULT: 5.9 % — HIGH (ref 4–5.6)
ESTIMATED AVERAGE GLUCOSE: 123 — SIGNIFICANT CHANGE UP

## 2022-05-31 RX ORDER — IBUPROFEN 200 MG
1 TABLET ORAL
Qty: 0 | Refills: 0 | DISCHARGE
Start: 2022-05-31

## 2022-05-31 RX ORDER — ACETAMINOPHEN 500 MG
3 TABLET ORAL
Qty: 0 | Refills: 0 | DISCHARGE
Start: 2022-05-31

## 2022-05-31 RX ORDER — OXYCODONE HYDROCHLORIDE 5 MG/1
5 TABLET ORAL
Refills: 0 | Status: DISCONTINUED | OUTPATIENT
Start: 2022-05-31 | End: 2022-06-01

## 2022-05-31 RX ORDER — OXYCODONE HYDROCHLORIDE 5 MG/1
1 TABLET ORAL
Qty: 8 | Refills: 0
Start: 2022-05-31 | End: 2022-06-01

## 2022-05-31 RX ORDER — NIFEDIPINE 30 MG
1 TABLET, EXTENDED RELEASE 24 HR ORAL
Qty: 28 | Refills: 1
Start: 2022-05-31 | End: 2022-07-25

## 2022-05-31 RX ADMIN — Medication 975 MILLIGRAM(S): at 09:54

## 2022-05-31 RX ADMIN — Medication 975 MILLIGRAM(S): at 03:29

## 2022-05-31 RX ADMIN — Medication 975 MILLIGRAM(S): at 09:24

## 2022-05-31 RX ADMIN — MAGNESIUM HYDROXIDE 30 MILLILITER(S): 400 TABLET, CHEWABLE ORAL at 12:17

## 2022-05-31 RX ADMIN — Medication 600 MILLIGRAM(S): at 05:53

## 2022-05-31 RX ADMIN — OXYCODONE HYDROCHLORIDE 5 MILLIGRAM(S): 5 TABLET ORAL at 15:13

## 2022-05-31 RX ADMIN — Medication 600 MILLIGRAM(S): at 00:12

## 2022-05-31 RX ADMIN — HEPARIN SODIUM 10000 UNIT(S): 5000 INJECTION INTRAVENOUS; SUBCUTANEOUS at 03:28

## 2022-05-31 RX ADMIN — Medication 600 MILLIGRAM(S): at 18:35

## 2022-05-31 RX ADMIN — Medication 600 MILLIGRAM(S): at 18:05

## 2022-05-31 RX ADMIN — Medication 325 MILLIGRAM(S): at 09:24

## 2022-05-31 RX ADMIN — Medication 600 MILLIGRAM(S): at 12:25

## 2022-05-31 RX ADMIN — Medication 975 MILLIGRAM(S): at 20:07

## 2022-05-31 RX ADMIN — Medication 600 MILLIGRAM(S): at 23:39

## 2022-05-31 RX ADMIN — Medication 500 MILLIGRAM(S): at 09:25

## 2022-05-31 RX ADMIN — Medication 600 MILLIGRAM(S): at 06:23

## 2022-05-31 RX ADMIN — Medication 600 MILLIGRAM(S): at 11:55

## 2022-05-31 RX ADMIN — Medication 975 MILLIGRAM(S): at 21:07

## 2022-05-31 RX ADMIN — HEPARIN SODIUM 10000 UNIT(S): 5000 INJECTION INTRAVENOUS; SUBCUTANEOUS at 18:05

## 2022-05-31 RX ADMIN — Medication 30 MILLIGRAM(S): at 09:24

## 2022-05-31 RX ADMIN — Medication 975 MILLIGRAM(S): at 03:59

## 2022-05-31 RX ADMIN — OXYCODONE HYDROCHLORIDE 5 MILLIGRAM(S): 5 TABLET ORAL at 14:43

## 2022-05-31 RX ADMIN — MAGNESIUM HYDROXIDE 30 MILLILITER(S): 400 TABLET, CHEWABLE ORAL at 23:58

## 2022-05-31 NOTE — PROVIDER CONTACT NOTE (CHANGE IN STATUS NOTIFICATION) - BACKGROUND
Patient had primary antonio. section on 5/29 at 14:27. Hx of preeclampsia status post magnesium sulfate infusion.

## 2022-05-31 NOTE — CHART NOTE - NSCHARTNOTEFT_GEN_A_CORE
At bedside due to infiltrated IV. Arrow placed by anesthesia.     Pt endorsing some pain near the arrow site on her left upper arm. At bedside due to infiltrated IV. Arrow placed by anesthesia.     Pt endorsing some pain near the arrow site on her left upper arm. 6x6 cm area of non edema near arrow site. No erythema or signs of infection. Arrow removed without difficulty. Pressure dressing applied     Callie Francis, PGY1  d/w Dr. Zambrano PGY4 At bedside due to infiltrated IV. Arrow placed by anesthesia.     Pt endorsing some pain near the arrow site on her left upper arm. 6x6 cm area of non edema near arrow site. No erythema or signs of infection. Arrow removed without difficulty. Pressure dressing applied     Callie Francis, PGY1  d/w Dr. Zambrano PGY4 and Dr. Ochoa -

## 2022-05-31 NOTE — PROVIDER CONTACT NOTE (CHANGE IN STATUS NOTIFICATION) - RECOMMENDATIONS
Dr Oscar removed arrow catheter and applied pressure dressing. Dr Francis removed arrow catheter and applied pressure dressing.

## 2022-06-01 VITALS
DIASTOLIC BLOOD PRESSURE: 66 MMHG | OXYGEN SATURATION: 100 % | RESPIRATION RATE: 18 BRPM | SYSTOLIC BLOOD PRESSURE: 136 MMHG | TEMPERATURE: 98 F | HEART RATE: 79 BPM

## 2022-06-01 RX ORDER — METOCLOPRAMIDE HCL 10 MG
5 TABLET ORAL ONCE
Refills: 0 | Status: COMPLETED | OUTPATIENT
Start: 2022-06-01 | End: 2022-06-01

## 2022-06-01 RX ADMIN — Medication 5 MILLIGRAM(S): at 03:31

## 2022-06-01 RX ADMIN — Medication 600 MILLIGRAM(S): at 11:37

## 2022-06-01 RX ADMIN — Medication 600 MILLIGRAM(S): at 00:39

## 2022-06-01 RX ADMIN — Medication 975 MILLIGRAM(S): at 04:29

## 2022-06-01 RX ADMIN — Medication 600 MILLIGRAM(S): at 11:03

## 2022-06-01 RX ADMIN — Medication 975 MILLIGRAM(S): at 03:29

## 2022-06-01 RX ADMIN — HEPARIN SODIUM 10000 UNIT(S): 5000 INJECTION INTRAVENOUS; SUBCUTANEOUS at 06:26

## 2022-06-01 RX ADMIN — Medication 975 MILLIGRAM(S): at 09:00

## 2022-06-01 RX ADMIN — Medication 30 MILLIGRAM(S): at 08:21

## 2022-06-01 RX ADMIN — Medication 975 MILLIGRAM(S): at 08:22

## 2022-06-01 NOTE — PROGRESS NOTE ADULT - ATTENDING COMMENTS
Associate [facility] of L [facility]    I have met this patient for the first time today.. She was admitted for IOL due to PEC with severe features by Dr Hoyos and delivered by Dr Cristhian Light    OB Progress Note:  Delivery, POD#1    S: 28yo POD#1 s/p LTCS . Her pain is well controlled. She is tolerating a regular diet and passing flatus. Denies N/V. Denies CP/SOB/lightheadedness/dizziness.   She is ambulating without difficulty.   Voiding spontaneously.     O:   Vital Signs Last 24 Hrs  T(C): 36.8 (31 May 2022 10:00), Max: 37.1 (30 May 2022 17:46)  T(F): 98.2 (31 May 2022 10:00), Max: 98.8 (30 May 2022 17:46)  HR: 74 (31 May 2022 10:00) (62 - 87)  BP: 132/65 (31 May 2022 10:00) (125/65 - 136/81)  RR: 18 (31 May 2022 10:00) (18 - 18)  SpO2: 100% (31 May 2022 10:00) (99% - 100%)    Labs:  Blood type: A Negative  Rubella IgG: RPR: Negative                          7.7<L>   16.99<H> >-----------< 329    (  @ 04:20 )             25.2<L>                        8.6<L>   17.52<H> >-----------< 328    (  @ 22:00 )             29.5<L>                        7.7<L>   12.74<H> >-----------< 293    (  @ 16:00 )             26.4<L>                        8.3<L>   10.55<H> >-----------< 311    (  @ 08:45 )             28.6<L>                        7.7<L>   11.39<H> >-----------< 391    (  @ 06:12 )             27.7<L>    22 @ 04:20  132<L>  |  102  |  7   ----------------------------<  128<H>  4.0   |  18<L>  |  0.69    22 @ 22:00  133<L>  |  101  |  7   ----------------------------<  119<H>  4.3   |  18<L>  |  0.76    22 @ 16:15  132<L>  |  102  |  8   ----------------------------<  107<H>  3.7   |  16<L>  |  0.85    22 @ 08:40  131<L>  |  99  |  8   ----------------------------<  92  3.7   |  15<L>  |  0.70      PE:    Abdomen: soft, fundus firm, Mildly distended, appropriately tender  incision c/d/i.  Extremities: No erythema, +2 edema    A/P: 28yo POD#1 s/p LTCS with arrest of dilatation and PEC with severe features s/p magnesium currently on Procardia 30 mg XL  - Continue regular diet.  - Increase ambulation.  - Continue motrin, tylenol, oxycodone PRN for pain control.  vs. continue PCEA for pain.  - Monitor BP's closely  - Contiinue procardia 30 mg XL    Rebecca Carrion M.D., M.B.A., M.S.
Associate [facility]    OB Progress Note:  Delivery, POD#3    S: 26yo POD#3 s/p LTCS . Patient having positional headaches. worse when she is sitting upright and     O:   Vital Signs Last 24 Hrs  T(C): 36.8 (2022 06:00), Max: 37.3 (2022 00:27)  T(F): 98.3 (2022 06:00), Max: 99.2 (2022 00:27)  HR: 80 (2022 08:24) (78 - 98)  BP: 142/82 (2022 08:24) (130/80 - 149/73)  RR: 18 (2022 08:24) (18 - 20)  SpO2: 97% (2022 08:24) (97% - 100%)    Labs:  Blood type: A Negative  Rubella IgG: RPR: Negative                          7.7<L>   16.99<H> >-----------< 329    (  @ 04:20 )             25.2<L>                        8.6<L>   17.52<H> >-----------< 328    (  @ 22:00 )             29.5<L>                        7.7<L>   12.74<H> >-----------< 293    (  @ 16:00 )             26.4<L>                        8.3<L>   10.55<H> >-----------< 311    (  @ 08:45 )             28.6<L>                        7.7<L>   11.39<H> >-----------< 391    (  @ 06:12 )             27.7<L>    22 @ 04:20  132<L>  |  102  |  7   ----------------------------<  128<H>  4.0   |  18<L>  |  0.69    22 @ 22:00  133<L>  |  101  |  7   ----------------------------<  119<H>  4.3   |  18<L>  |  0.76    22 @ 16:15  132<L>  |  102  |  8   ----------------------------<  107<H>  3.7   |  16<L>  |  0.85    22 @ 08:40  131<L>  |  99  |  8   ----------------------------<  92  3.7   |  15<L>  |  0.70      PE:    Abdomen: soft, fundus firm, Mildly distended, appropriately tender  incision c/d/i.  Extremities: No erythema, +2 edema    A/P: 26yo POD#3 s/p LTCS with arrest of dilatation and PEC with severe features s/p magnesium currently on Procardia 30 mg XL and positional HA    - Patient discharged  - Monitor BP's closely  - Continue procardia 30 mg XL  - Seen by anesthesia and they provided recommendation  -  Recommended head CT and patient has signed refusal of treatment form and desires to go home    Rebecca Carrion M.D., M.B.A., M.S.
Late Entry:     Pt seen and examined, at time of evaluation pt reported a mild HA pt was awaiting PO meds.  Denies CP, SOB, calf pain, fevers/chills.  Denies visual changes RUQ/epigastric pain.  Pt tolerating reg diet -n/-v.  pt reports "small amount" of flatus.  Denies feeling dizzy or lightheaded.      BPs within goal range currently  General: NAD  Abd: softly distended, tympanic to percussion, ATTP  Incision: C/D/I subcutaneous closure with steristrips   Ext: venodyne boots on and inflating b/l, no calf tenderness b/l      POD #1 sp PLTCS 2/2 arrest, course complicated by preeclampsia with severe features was on magnesium sulfate for seizure ppx.    1.  preeclampsia with severe features- continue to montior labs, continue magnesium sulfate for seizure ppx until 2:30 pm.  Please notify team if HA does not resolve s/p meds.  labs wnl.  BP's controlled with procardia 30mg XL, will continue to monitor closely  2.  Encourage ambulation to bathroom and chair today  3.  Continue routine postop and pp care    Margaret Cohen MD

## 2022-06-01 NOTE — PROGRESS NOTE ADULT - ASSESSMENT
A/P: 26yo POD#2 s/p pLTCS 2/2 arrest.  Patient is stable and doing well post-operatively.      pre-eclampsia with severe features  -continue procardia 30xL  -blood pressures well controlled overnight, continue to monitor  -patient is s/p Magnesium  -s/p Labetalol 20, Procardia 10/20      post partum  - Continue regular diet.  - Increase ambulation.  - Continue motrin, tylenol, oxycodone PRN for pain control.      Viky Gannon MD PGY1  
A/P: 28yo POD#1 s/p pLTCS for arrest. Patient is COVID + and has sPEC now on Magnesium.  Patient is stable and doing well post-operatively.      #COVID +  -continue to monitor VS  -VSS at this time, no increased work of breathing    #sPEC/Mg  -met criteria via BP s/p Lab 20, Pro 10/20  -continue Mag for 24 hours PP for seizure prophylaxis  -continue Pro 30 XL    #PP care  - Continue regular diet.  - Increase ambulation.  - Continue motrin, tylenol, oxycodone PRN for pain control.    - AM CBC H+H downtrended more than expected for QBL though patient remains asymptomatic.    Yaa Iyer, PGY1   
A/P: 28yo POD#3 s/p pLTCS 2/2 arrest.  Patient is stable and is doing well post-operatively. Patient complained of a headache that she rated 8/10 this AM.    headache  -given the fact that headache is postural in nature, high suspicion for post-dural headache at this time  -encouraged PO hydration as well as caffeine  -anesthesia consulted to see the patient, appreciate recommendations  -CT Head without contrast ordered to rule out stroke and other pathologies    pre-eclampsia with severe features  -continue procardia 30xL  -s/p Magnesium  -blood pressures well controlled overnight, continue to monitor    post partum  - Continue motrin, tylenol, oxycodone PRN for pain control.  - Increase ambulation  - Continue regular diet  - Discharge planning    Viky Gannon MD PGY1

## 2022-06-01 NOTE — PROVIDER CONTACT NOTE (CHANGE IN STATUS NOTIFICATION) - SITUATION
Patient complaining of pounding headache throughout the day that is not getting better.
Left upper arm edematous

## 2022-06-01 NOTE — CHART NOTE - NSCHARTNOTEFT_GEN_A_CORE
Patient refused to go down to CT scan that was ordered due to persistent headache. Patient claimed that her headache was feeling better and wanted to go home. Dr. Holly obtained refusal of treatment paperwork with the patient. Patient aware of the risks of going home including stroke and brain hemorrhage. Patient continued to refuse MRI    Viky Gannon PGY-1 Patient refused to go down to CT scan that was ordered due to persistent headache to rule out stroke and brain bleed. Patient claimed that her headache felt better when she laid down and wanted to go home. Asked if she could come back to the hospital if the pain got worse and was informed that she could return 24 hours a day. Patient had been seen by anesthesia earlier in the day who said that the headache was most likely post-dural and recommended caffeine, hydration, and abdominal binder for treatment. Dr. Holly obtained refusal of treatment paperwork with the patient. Patient aware of the risks of going home including stroke and brain hemorrhage. Patient continued to refuse MRI    Viky Gannon PGY-1 Patient refused to go down to CT scan that was ordered due to persistent headache. Dr. Holly obtained refusal of treatment paperwork with the patient. Patient aware of the risks of going home including stroke and brain hemorrhage.    Viky Gannon PGY-1      [facility]ate [facility]    Agree with above  Rebecca Carrion M.D., M.B.A., M.S.

## 2022-06-01 NOTE — PROVIDER CONTACT NOTE (CHANGE IN STATUS NOTIFICATION) - ACTION/TREATMENT ORDERED:
One time dose of  Reglan 5mg ordered for headache as per MD Francis.
Patient remains afebrile. Will continue to observe left arm.

## 2022-06-01 NOTE — PROVIDER CONTACT NOTE (CHANGE IN STATUS NOTIFICATION) - ASSESSMENT
Left arm on right side shows edema and swelling.
/73 HR 92 no changes in vision or any other symptoms noted.

## 2022-06-01 NOTE — PROGRESS NOTE ADULT - SUBJECTIVE AND OBJECTIVE BOX
OB Postpartum Note:  Delivery, POD#3    S: 28yo POD#3 s/p pLTCS 2/2 arrest at 5cm. Pregnancy c/b pre-eclampsia with severe features. This morning patient complained of a headache that she rated as 8/10. States that the pain is worse when she sits up (8/10) and gets better when she lies down (5/10).  Pain is well controlled. She is tolerating a regular diet and passing flatus. She is voiding spontaneously, and ambulating without difficulty. Denies CP/SOB. Denies lightheadedness/dizziness. Denies N/V. Denies changes in vision, RUQ pain, epigastric pain.    O:  Vitals:  Vital Signs Last 24 Hrs  T(C): 36.8 (2022 06:00), Max: 37.3 (2022 00:27)  T(F): 98.3 (2022 06:00), Max: 99.2 (2022 00:27)  HR: 85 (2022 06:00) (68 - 98)  BP: 130/80 (2022 06:00) (130/80 - 149/73)  BP(mean): --  RR: 20 (2022 06:00) (18 - 20)  SpO2: 100% (2022 06:00) (100% - 100%)    MEDICATIONS  (STANDING):  acetaminophen     Tablet .. 975 milliGRAM(s) Oral <User Schedule>  ascorbic acid 500 milliGRAM(s) Oral daily  diphtheria/tetanus/pertussis (acellular) Vaccine (ADAcel) 0.5 milliLiter(s) IntraMuscular once  ferrous    sulfate 325 milliGRAM(s) Oral daily  heparin   Injectable 70147 Unit(s) SubCutaneous every 12 hours  ibuprofen  Tablet. 600 milliGRAM(s) Oral every 6 hours  NIFEdipine XL 30 milliGRAM(s) Oral daily  varicella virus (live) Vaccine 0.5 milliLiter(s) SubCutaneous once    MEDICATIONS  (PRN):  dexAMETHasone  Injectable 4 milliGRAM(s) IV Push every 6 hours PRN Nausea  diphenhydrAMINE 25 milliGRAM(s) Oral every 6 hours PRN Pruritus  lanolin Ointment 1 Application(s) Topical every 6 hours PRN Sore Nipples  magnesium hydroxide Suspension 30 milliLiter(s) Oral two times a day PRN Constipation  naloxone Injectable 0.1 milliGRAM(s) IV Push every 3 minutes PRN For ANY of the following changes in patient status:  A. Breaths Per Minute LESS THAN 10, B. Oxygen saturation LESS THAN 90%, C. Sedation score of 6 for Stop After: 4 Times  ondansetron Injectable 4 milliGRAM(s) IV Push every 6 hours PRN Nausea  oxyCODONE    IR 5 milliGRAM(s) Oral once PRN Moderate to Severe Pain (4-10)  oxyCODONE    IR 5 milliGRAM(s) Oral every 3 hours PRN Moderate to Severe Pain (4-10)  simethicone 80 milliGRAM(s) Chew every 4 hours PRN Gas      LABS:  Blood type: A Negative  Rubella IgG: RPR: Negative                          7.7<L>   16.99<H> >-----------< 329    (  @ 04:20 )             25.2<L>                        8.6<L>   17.52<H> >-----------< 328    (  @ 22:00 )             29.5<L>                        7.7<L>   12.74<H> >-----------< 293    (  @ 16:00 )             26.4<L>                        8.3<L>   10.55<H> >-----------< 311    (  @ 08:45 )             28.6<L>    22 @ 04:20      132<L>  |  102  |  7   ----------------------------<  128<H>  4.0   |  18<L>  |  0.69    22 @ 22:00      133<L>  |  101  |  7   ----------------------------<  119<H>  4.3   |  18<L>  |  0.76    22 @ 16:15      132<L>  |  102  |  8   ----------------------------<  107<H>  3.7   |  16<L>  |  0.85    22 @ 08:40      131<L>  |  99  |  8   ----------------------------<  92  3.7   |  15<L>  |  0.70        Ca    7.2<L>      30 May 2022 04:20  Ca    7.2<L>      29 May 2022 22:00  Ca    6.8<L>      29 May 2022 16:15  Ca    6.9<L>      29 May 2022 08:40  Mg     5.40<H>       Mg     5.60<H>       Mg     7.60<HH>       Mg     6.70<H>         TPro  5.8<L>  /  Alb  2.4<L>  /  TBili  <0.2  /  DBili  x   /  AST  17  /  ALT  5   /  AlkPhos  119  22 @ 04:20  TPro  6.7  /  Alb  3.1<L>  /  TBili  <0.2  /  DBili  x   /  AST  14  /  ALT  6   /  AlkPhos  142<H>  22 @ 22:00  TPro  5.6<L>  /  Alb  2.5<L>  /  TBili  <0.2  /  DBili  x   /  AST  13  /  ALT  5   /  AlkPhos  125<H>  22 @ 16:15  TPro  6.6  /  Alb  2.9<L>  /  TBili  0.3  /  DBili  x   /  AST  13  /  ALT  7   /  AlkPhos  139<H>  22 @ 08:40          Physical exam:  Gen: NAD  Abdomen: Soft, nontender, no distension , firm uterine fundus at umbilicus.  Incision: Clean, dry, and intact   Pelvic: Normal lochia noted  Ext: No calf tenderness, 1+edema    
Postop Day  __1_ s/p   C- Section    THERAPY:  [  ] Spinal morphine   [ x ] Epidural morphine   [  ] IV PCA Hydromorphone 1 mg/ml      Sedation Score:	  [ x ] Alert	    [  ] Drowsy        [  ] Arousable	[  ] Asleep	[  ] Unresponsive    Side Effects:	  [ x ] None	     [  ] Nausea        [  ] Pruritus        [  ] Weakness   [  ] Numbness        ASSESSMENT/ PLAN   [   ] Discontinue         [  ] Continue  [ x ]Documentation and Verification of current medications     Comments:       Patient is doing well.  OOBAA. Tolerating clears.  Pain is tolerable.  No residual anesthetic issues or complications noted.  
Complaining of postural HA since yesterday  Had labor epidural and  on .  Headache improved by lying down and tylenol.  no diplopia or tinnitus.    Impression: postdural puncture headache  plan is conservative measures for now;  PO fluids, caffeine, bedrest, abdominal binder
OB Progress Note:  Delivery, POD#1    S: 26yo POD#1 s/p pLTCS for arrest c/b C+ status and sPEC. Her pain is well controlled. She is tolerating a regular diet and passing flatus. Denies N/V. Denies CP/SOB/lightheadedness/dizziness. She is ambulating without difficulty. Voiding spontaneously. Patient denies HA, SOB, changes or spots in vision, new swelling in the hands and face, or RUQ/epigastric pain.     O:   Vital Signs Last 24 Hrs  T(C): 36.4 (30 May 2022 06:00), Max: 37 (30 May 2022 02:00)  T(F): 97.5 (30 May 2022 06:00), Max: 98.6 (30 May 2022 02:00)  HR: 65 (30 May 2022 06:00) (65 - 114)  BP: 124/62 (30 May 2022 06:00) (94/42 - 161/102)  BP(mean): 87 (29 May 2022 21:30) (53 - 110)  RR: 19 (30 May 2022 06:00) (5 - 30)  SpO2: 99% (30 May 2022 06:00) (96% - 100%)    Labs:  Blood type: A Negative  Rubella IgG: RPR: Negative                          7.7<L>   16.99<H> >-----------< 329    (  @ 04:20 )             25.2<L>                        8.6<L>   17.52<H> >-----------< 328    (  @ 22:00 )             29.5<L>                        7.7<L>   12.74<H> >-----------< 293    (  @ 16:00 )             26.4<L>                        8.3<L>   10.55<H> >-----------< 311    (  @ 08:45 )             28.6<L>                        7.7<L>   11.39<H> >-----------< 391    (  @ 06:12 )             27.7<L>                        9.9<L>   8.77 >-----------< 382    (  @ 08:35 )             33.6<L>                        8.8<L>   7.70 >-----------< 325    (  @ 15:28 )             30.3<L>                        8.5<L>   8.22 >-----------< 340    (  @ 08:40 )             28.1<L>    22 @ 04:20      132<L>  |  102  |  7   ----------------------------<  128<H>  4.0   |  18<L>  |  0.69    22 22:00      133<L>  |  101  |  7   ----------------------------<  119<H>  4.3   |  18<L>  |  0.76    22 16:15      132<L>  |  102  |  8   ----------------------------<  107<H>  3.7   |  16<L>  |  0.85    22 08:40      131<L>  |  99  |  8   ----------------------------<  92  3.7   |  15<L>  |  0.70    22 @ 08:35      131<L>  |  98  |  5<L>  ----------------------------<  91  3.9   |  18<L>  |  0.63    22 @ 15:28      132<L>  |  102  |  4<L>  ----------------------------<  90  4.1   |  20<L>  |  0.50    22 @ 08:40      133<L>  |  102  |  5<L>  ----------------------------<  98  4.1   |  19<L>  |  0.52        Ca    7.2<L>      30 May 2022 04:20  Ca    7.2<L>      29 May 2022 22:00  Ca    6.8<L>      29 May 2022 16:15  Ca    6.9<L>      29 May 2022 08:40  Ca    7.6<L>      28 May 2022 08:35  Ca    7.6<L>      27 May 2022 15:28  Ca    7.9<L>      27 May 2022 08:40  Mg     5.40<H>     05-30  Mg     5.60<H>     05-29  Mg     7.60<HH>     05-29  Mg     6.70<H>     05-29  Mg     6.00<H>     05-29  Mg     6.60<H>     05-28  Mg     8.20<HH>     05-28  Mg     6.80<H>     05-28  Mg     6.60<H>     05-27  Mg     6.40<H>     05-27  Mg     5.70<H>     05-27    TPro  5.8<L>  /  Alb  2.4<L>  /  TBili  <0.2  /  DBili  x   /  AST  17  /  ALT  5   /  AlkPhos  119  22 @ 04:20  TPro  6.7  /  Alb  3.1<L>  /  TBili  <0.2  /  DBili  x   /  AST  14  /  ALT  6   /  AlkPhos  142<H>  22 @ 22:00  TPro  5.6<L>  /  Alb  2.5<L>  /  TBili  <0.2  /  DBili  x   /  AST  13  /  ALT  5   /  AlkPhos  125<H>  22 @ 16:15  TPro  6.6  /  Alb  2.9<L>  /  TBili  0.3  /  DBili  x   /  AST  13  /  ALT  7   /  AlkPhos  139<H>  22 @ 08:40  TPro  7.4  /  Alb  3.7  /  TBili  0.3  /  DBili  x   /  AST  10  /  ALT  12  /  AlkPhos  164<H>  22 @ 08:35  TPro  6.9  /  Alb  3.5  /  TBili  0.2  /  DBili  x   /  AST  11  /  ALT  7   /  AlkPhos  142<H>  05-27-22 @ 15:28  TPro  6.8  /  Alb  3.2<L>  /  TBili  <0.2  /  DBili  x   /  AST  14  /  ALT  8   /  AlkPhos  139<H>  22 @ 08:40          PE:  General: NAD, No increased work of breathing  Abdomen: Mildly distended, appropriately tender, incision c/d/i. Uterine fundus firm and palpated at midline  Extremities: No erythema, no pitting edema, no calf tenderness bilaterally    
OB Progress Note: LTCS, POD#2    S: 26yo POD#2 s/p pLTCS 2/2 arrest at 5cm. Delivery complicated by pre-eclampsia with severe features (blood pressure) as well as GDMA1. Pain is well controlled. She is tolerating a regular diet and passing flatus. She is voiding spontaneously, and ambulating without difficulty. Denies CP/SOB. Denies lightheadedness/dizziness. Denies N/V. Denies headache, changes in vision, RUQ pain, epigastric pain.    O:  Vitals:  Vital Signs Last 24 Hrs  T(C): 36.8 (31 May 2022 06:13), Max: 37.1 (30 May 2022 17:46)  T(F): 98.2 (31 May 2022 06:13), Max: 98.8 (30 May 2022 17:46)  HR: 62 (31 May 2022 06:13) (62 - 87)  BP: 136/81 (31 May 2022 06:13) (125/65 - 136/81)  BP(mean): --  RR: 18 (31 May 2022 06:13) (18 - 18)  SpO2: 100% (31 May 2022 06:13) (98% - 100%)    MEDICATIONS  (STANDING):  acetaminophen     Tablet .. 975 milliGRAM(s) Oral <User Schedule>  ascorbic acid 500 milliGRAM(s) Oral daily  diphtheria/tetanus/pertussis (acellular) Vaccine (ADAcel) 0.5 milliLiter(s) IntraMuscular once  ferrous    sulfate 325 milliGRAM(s) Oral daily  heparin   Injectable 91232 Unit(s) SubCutaneous every 12 hours  ibuprofen  Tablet. 600 milliGRAM(s) Oral every 6 hours  NIFEdipine XL 30 milliGRAM(s) Oral daily  varicella virus (live) Vaccine 0.5 milliLiter(s) SubCutaneous once      MEDICATIONS  (PRN):  dexAMETHasone  Injectable 4 milliGRAM(s) IV Push every 6 hours PRN Nausea  diphenhydrAMINE 25 milliGRAM(s) Oral every 6 hours PRN Pruritus  lanolin Ointment 1 Application(s) Topical every 6 hours PRN Sore Nipples  magnesium hydroxide Suspension 30 milliLiter(s) Oral two times a day PRN Constipation  naloxone Injectable 0.1 milliGRAM(s) IV Push every 3 minutes PRN For ANY of the following changes in patient status:  A. Breaths Per Minute LESS THAN 10, B. Oxygen saturation LESS THAN 90%, C. Sedation score of 6 for Stop After: 4 Times  ondansetron Injectable 4 milliGRAM(s) IV Push every 6 hours PRN Nausea  oxyCODONE    IR 5 milliGRAM(s) Oral every 3 hours PRN Moderate to Severe Pain (4-10)  oxyCODONE    IR 5 milliGRAM(s) Oral once PRN Moderate to Severe Pain (4-10)  simethicone 80 milliGRAM(s) Chew every 4 hours PRN Gas      Labs:  Blood type: A Negative  Rubella IgG: RPR: Negative                          7.7<L>   16.99<H> >-----------< 329    ( 05-30 @ 04:20 )             25.2<L>                        8.6<L>   17.52<H> >-----------< 328    ( 05-29 @ 22:00 )             29.5<L>                        7.7<L>   12.74<H> >-----------< 293    ( 05-29 @ 16:00 )             26.4<L>                        8.3<L>   10.55<H> >-----------< 311    ( 05-29 @ 08:45 )             28.6<L>                        7.7<L>   11.39<H> >-----------< 391    ( 05-29 @ 06:12 )             27.7<L>                        9.9<L>   8.77 >-----------< 382    ( 05-28 @ 08:35 )             33.6<L>    05-30-22 @ 04:20      132<L>  |  102  |  7   ----------------------------<  128<H>  4.0   |  18<L>  |  0.69    05-29-22 @ 22:00      133<L>  |  101  |  7   ----------------------------<  119<H>  4.3   |  18<L>  |  0.76    05-29-22 @ 16:15      132<L>  |  102  |  8   ----------------------------<  107<H>  3.7   |  16<L>  |  0.85    05-29-22 @ 08:40      131<L>  |  99  |  8   ----------------------------<  92  3.7   |  15<L>  |  0.70    05-28-22 @ 08:35      131<L>  |  98  |  5<L>  ----------------------------<  91  3.9   |  18<L>  |  0.63        Ca    7.2<L>      30 May 2022 04:20  Ca    7.2<L>      29 May 2022 22:00  Ca    6.8<L>      29 May 2022 16:15  Ca    6.9<L>      29 May 2022 08:40  Ca    7.6<L>      28 May 2022 08:35  Mg     5.40<H>     05-30  Mg     5.60<H>     05-29  Mg     7.60<HH>     05-29  Mg     6.70<H>     05-29  Mg     6.00<H>     05-29  Mg     6.60<H>     05-28  Mg     8.20<HH>     05-28    TPro  5.8<L>  /  Alb  2.4<L>  /  TBili  <0.2  /  DBili  x   /  AST  17  /  ALT  5   /  AlkPhos  119  05-30-22 @ 04:20  TPro  6.7  /  Alb  3.1<L>  /  TBili  <0.2  /  DBili  x   /  AST  14  /  ALT  6   /  AlkPhos  142<H>  05-29-22 @ 22:00  TPro  5.6<L>  /  Alb  2.5<L>  /  TBili  <0.2  /  DBili  x   /  AST  13  /  ALT  5   /  AlkPhos  125<H>  05-29-22 @ 16:15  TPro  6.6  /  Alb  2.9<L>  /  TBili  0.3  /  DBili  x   /  AST  13  /  ALT  7   /  AlkPhos  139<H>  05-29-22 @ 08:40  TPro  7.4  /  Alb  3.7  /  TBili  0.3  /  DBili  x   /  AST  10  /  ALT  12  /  AlkPhos  164<H>  05-28-22 @ 08:35      PE:  General: NAD  Abdomen: Soft, appropriately tender, incision c/d/i.  Extremities: No erythema, 2+edema bilaterally

## 2022-06-02 ENCOUNTER — NON-APPOINTMENT (OUTPATIENT)
Age: 27
End: 2022-06-02

## 2022-06-02 DIAGNOSIS — F41.9 ANXIETY DISORDER, UNSPECIFIED: ICD-10-CM

## 2022-06-02 DIAGNOSIS — F32.A ANXIETY DISORDER, UNSPECIFIED: ICD-10-CM

## 2022-06-02 RX ORDER — BLOOD-GLUCOSE METER
KIT MISCELLANEOUS
Qty: 1 | Refills: 0 | Status: DISCONTINUED | COMMUNITY
Start: 2022-03-23 | End: 2022-06-02

## 2022-06-02 RX ORDER — BLOOD SUGAR DIAGNOSTIC
STRIP MISCELLANEOUS 4 TIMES DAILY
Qty: 1 | Refills: 4 | Status: DISCONTINUED | COMMUNITY
Start: 2022-03-23 | End: 2022-06-02

## 2022-06-02 RX ORDER — LANCETS 28 GAUGE
EACH MISCELLANEOUS
Qty: 1 | Refills: 4 | Status: DISCONTINUED | COMMUNITY
Start: 2022-03-23 | End: 2022-06-02

## 2022-06-02 RX ORDER — ASPIRIN 81 MG/1
81 TABLET, COATED ORAL DAILY
Qty: 90 | Refills: 3 | Status: DISCONTINUED | COMMUNITY
Start: 2021-12-02 | End: 2022-06-02

## 2022-06-03 ENCOUNTER — APPOINTMENT (OUTPATIENT)
Dept: OBGYN | Facility: HOSPITAL | Age: 27
End: 2022-06-03

## 2022-06-03 ENCOUNTER — TRANSCRIPTION ENCOUNTER (OUTPATIENT)
Age: 27
End: 2022-06-03

## 2022-06-03 ENCOUNTER — OUTPATIENT (OUTPATIENT)
Dept: OUTPATIENT SERVICES | Facility: HOSPITAL | Age: 27
LOS: 1 days | End: 2022-06-03

## 2022-06-03 VITALS
WEIGHT: 254 LBS | HEART RATE: 82 BPM | HEIGHT: 65 IN | SYSTOLIC BLOOD PRESSURE: 130 MMHG | BODY MASS INDEX: 42.32 KG/M2 | DIASTOLIC BLOOD PRESSURE: 80 MMHG | TEMPERATURE: 97.3 F

## 2022-06-03 NOTE — HISTORY OF PRESENT ILLNESS
[Postpartum Follow Up] : postpartum follow up [Complications:___] : complications include: [unfilled] [Last Pap Date: ___] : Last Pap Date: [unfilled] [Delivery Date: ___] : on [unfilled] [Primary C/S] : delivered by  section [Male] : Delivery History: baby boy [Wt. ___] : weighing [unfilled] [Rhogam] : Rhogam administered [Rubella Vaccine] : Rubella vaccine was not administered [BTL] : no tubal ligation [Breastfeeding] : currently nursing [Discharge HCT: ___] : hematocrit level was [unfilled] [Discharge HGB: ___] : hemoglobin level was [unfilled] [Resumed Menses] : has not resumed her menses [Resumed [place]] : has not resumed intercourse [Intended Contraception] : Intended Contraception: [Condoms] : condoms [None] : No associated symptoms are reported [Clean/Dry/Intact] : clean, dry and intact [Doing Well] : is doing well [FreeTextEntry8] : Here for BP check.  21 at 38 weeks 5days [de-identified] : Bp today 130/80. BPs running 120/130-80/90. Taking Procardia XL 30 mg po once daily. CBC Hgb 7.7 Hct 25.2. Rx given prenatal vit and Iron 1 tab po once daily. Breastfeeding/happy with baby. RTC 6/6/22 for incision check and Bp check. MHegarty NP

## 2022-06-04 ENCOUNTER — NON-APPOINTMENT (OUTPATIENT)
Age: 27
End: 2022-06-04

## 2022-06-06 ENCOUNTER — OUTPATIENT (OUTPATIENT)
Dept: OUTPATIENT SERVICES | Facility: HOSPITAL | Age: 27
LOS: 1 days | End: 2022-06-06

## 2022-06-06 ENCOUNTER — APPOINTMENT (OUTPATIENT)
Dept: OBGYN | Facility: HOSPITAL | Age: 27
End: 2022-06-06

## 2022-06-06 ENCOUNTER — RESULT REVIEW (OUTPATIENT)
Age: 27
End: 2022-06-06

## 2022-06-06 VITALS
TEMPERATURE: 98 F | BODY MASS INDEX: 41.48 KG/M2 | HEART RATE: 90 BPM | WEIGHT: 249 LBS | DIASTOLIC BLOOD PRESSURE: 84 MMHG | HEIGHT: 65 IN | SYSTOLIC BLOOD PRESSURE: 129 MMHG

## 2022-06-06 DIAGNOSIS — O14.90 UNSPECIFIED PRE-ECLAMPSIA, UNSPECIFIED TRIMESTER: ICD-10-CM

## 2022-06-06 DIAGNOSIS — Z98.891 HISTORY OF UTERINE SCAR FROM PREVIOUS SURGERY: ICD-10-CM

## 2022-06-06 DIAGNOSIS — Z01.30 ENCOUNTER FOR EXAMINATION OF BLOOD PRESSURE WITHOUT ABNORMAL FINDINGS: ICD-10-CM

## 2022-06-06 DIAGNOSIS — Z87.59 PERSONAL HISTORY OF OTHER COMPLICATIONS OF PREGNANCY, CHILDBIRTH AND THE PUERPERIUM: ICD-10-CM

## 2022-06-06 LAB
BASOPHILS # BLD AUTO: 0.06 K/UL — SIGNIFICANT CHANGE UP (ref 0–0.2)
BASOPHILS NFR BLD AUTO: 0.7 % — SIGNIFICANT CHANGE UP (ref 0–2)
EOSINOPHIL # BLD AUTO: 0.27 K/UL — SIGNIFICANT CHANGE UP (ref 0–0.5)
EOSINOPHIL NFR BLD AUTO: 3 % — SIGNIFICANT CHANGE UP (ref 0–6)
HCT VFR BLD CALC: 31.8 % — LOW (ref 34.5–45)
HGB BLD-MCNC: 8.9 G/DL — LOW (ref 11.5–15.5)
IANC: 5.92 K/UL — SIGNIFICANT CHANGE UP (ref 1.8–7.4)
IMM GRANULOCYTES NFR BLD AUTO: 4.4 % — HIGH (ref 0–1.5)
LYMPHOCYTES # BLD AUTO: 1.65 K/UL — SIGNIFICANT CHANGE UP (ref 1–3.3)
LYMPHOCYTES # BLD AUTO: 18.1 % — SIGNIFICANT CHANGE UP (ref 13–44)
MCHC RBC-ENTMCNC: 21.1 PG — LOW (ref 27–34)
MCHC RBC-ENTMCNC: 28 GM/DL — LOW (ref 32–36)
MCV RBC AUTO: 75.5 FL — LOW (ref 80–100)
MONOCYTES # BLD AUTO: 0.8 K/UL — SIGNIFICANT CHANGE UP (ref 0–0.9)
MONOCYTES NFR BLD AUTO: 8.8 % — SIGNIFICANT CHANGE UP (ref 2–14)
NEUTROPHILS # BLD AUTO: 5.92 K/UL — SIGNIFICANT CHANGE UP (ref 1.8–7.4)
NEUTROPHILS NFR BLD AUTO: 65 % — SIGNIFICANT CHANGE UP (ref 43–77)
NRBC # BLD: 0 /100 WBCS — SIGNIFICANT CHANGE UP
NRBC # FLD: 0.03 K/UL — HIGH
PLATELET # BLD AUTO: 516 K/UL — HIGH (ref 150–400)
RBC # BLD: 4.21 M/UL — SIGNIFICANT CHANGE UP (ref 3.8–5.2)
RBC # FLD: 17.5 % — HIGH (ref 10.3–14.5)
WBC # BLD: 9.1 K/UL — SIGNIFICANT CHANGE UP (ref 3.8–10.5)
WBC # FLD AUTO: 9.1 K/UL — SIGNIFICANT CHANGE UP (ref 3.8–10.5)

## 2022-06-06 NOTE — HISTORY OF PRESENT ILLNESS
[Delivery Date: ___] : on [unfilled] [Wt. ___] : weighing [unfilled] [Rhogam] : Rhogam administered [Pertussis Vaccine] : Pertussis vaccine administered [Discharge HCT: ___] : hematocrit level was [unfilled] [Discharge HGB: ___] : hemoglobin level was [unfilled] [Intended Contraception] : Intended Contraception: [Condoms] : condoms [None] : The patient is currently asymptomatic [Clean/Dry/Intact] : clean, dry and intact [Not Done] : Examination of breasts not done [Rubella Vaccine] : Rubella vaccine was not administered [BTL] : no tubal ligation [Resumed Menses] : has not resumed her menses [Resumed [place]] : has not resumed intercourse [Erythema] : not erythematous [Swelling] : not swollen [Dehiscence] : not dehisced [FreeTextEntry8] : I am here for a blood pressure and wound check [FreeTextEntry9] : GDM and PEC [de-identified] : c section [de-identified] : bp today 129/84 pt hasn’t taken procaardia xl for 2 days says she doesn’t feel well with meds and doesn’t want to take it despite recommendations.. states has BP machine at home 120s/80s.  pt states she know to call if BP is over 140/90. pt denies symptoms of PEC [de-identified] : some steri strips partially attached . pt wants to leave steri sstrips in place [de-identified] : s/p PEC GDM and c section. pt not taking BP meds doesn’t want to take them. BPs nL at home according to pt [de-identified] : pt to take bps 1-2 x/day call if bp over 140 /90. call for symptoms of PEC TO ER if severe headache or BP over 150/100 and cannot contact ACU . RTC 7/7/22 for 2hr GTT NPO after midnight day before. Importance of taking BPs at least once a day stressed . pt understands and agrees. cardiology referral with task to alpesh broderick done. call for questions or concerns continue iron CBC today

## 2022-06-07 ENCOUNTER — NON-APPOINTMENT (OUTPATIENT)
Age: 27
End: 2022-06-07

## 2022-06-07 DIAGNOSIS — Z01.30 ENCOUNTER FOR EXAMINATION OF BLOOD PRESSURE WITHOUT ABNORMAL FINDINGS: ICD-10-CM

## 2022-06-07 DIAGNOSIS — Z87.59 PERSONAL HISTORY OF OTHER COMPLICATIONS OF PREGNANCY, CHILDBIRTH AND THE PUERPERIUM: ICD-10-CM

## 2022-06-09 ENCOUNTER — TRANSCRIPTION ENCOUNTER (OUTPATIENT)
Age: 27
End: 2022-06-09

## 2022-06-15 NOTE — OB RN PATIENT PROFILE - HISTORY OF COVID-19 VACCINATION
Yes Xeljanz Counseling: I discussed with the patient the risks of Xeljanz therapy including increased risk of infection, liver issues, headache, diarrhea, or cold symptoms. Live vaccines should be avoided. They were instructed to call if they have any problems.

## 2022-06-16 ENCOUNTER — NON-APPOINTMENT (OUTPATIENT)
Age: 27
End: 2022-06-16

## 2022-07-04 LAB — SURGICAL PATHOLOGY STUDY: SIGNIFICANT CHANGE UP

## 2022-07-07 ENCOUNTER — APPOINTMENT (OUTPATIENT)
Dept: OBGYN | Facility: HOSPITAL | Age: 27
End: 2022-07-07

## 2022-07-07 ENCOUNTER — NON-APPOINTMENT (OUTPATIENT)
Age: 27
End: 2022-07-07

## 2022-07-11 ENCOUNTER — NON-APPOINTMENT (OUTPATIENT)
Age: 27
End: 2022-07-11

## 2022-07-12 ENCOUNTER — NON-APPOINTMENT (OUTPATIENT)
Age: 27
End: 2022-07-12

## 2022-07-13 ENCOUNTER — NON-APPOINTMENT (OUTPATIENT)
Age: 27
End: 2022-07-13

## 2022-08-02 ENCOUNTER — APPOINTMENT (OUTPATIENT)
Dept: OBGYN | Facility: HOSPITAL | Age: 27
End: 2022-08-02

## 2022-08-02 ENCOUNTER — NON-APPOINTMENT (OUTPATIENT)
Age: 27
End: 2022-08-02

## 2022-08-03 ENCOUNTER — NON-APPOINTMENT (OUTPATIENT)
Age: 27
End: 2022-08-03

## 2022-08-12 ENCOUNTER — OUTPATIENT (OUTPATIENT)
Dept: OUTPATIENT SERVICES | Facility: HOSPITAL | Age: 27
LOS: 1 days | End: 2022-08-12

## 2022-08-12 ENCOUNTER — APPOINTMENT (OUTPATIENT)
Dept: OBGYN | Facility: HOSPITAL | Age: 27
End: 2022-08-12

## 2022-08-12 ENCOUNTER — RESULT REVIEW (OUTPATIENT)
Age: 27
End: 2022-08-12

## 2022-08-12 VITALS
BODY MASS INDEX: 40.54 KG/M2 | DIASTOLIC BLOOD PRESSURE: 65 MMHG | TEMPERATURE: 97.5 F | HEART RATE: 72 BPM | WEIGHT: 243.3 LBS | SYSTOLIC BLOOD PRESSURE: 120 MMHG | HEIGHT: 65 IN

## 2022-08-12 DIAGNOSIS — Z30.09 ENCOUNTER FOR OTHER GENERAL COUNSELING AND ADVICE ON CONTRACEPTION: ICD-10-CM

## 2022-08-12 DIAGNOSIS — Z87.59 PERSONAL HISTORY OF OTHER COMPLICATIONS OF PREGNANCY, CHILDBIRTH AND THE PUERPERIUM: ICD-10-CM

## 2022-08-12 LAB
GESTATIONAL GLUCOSE 1 HR (ADA): SIGNIFICANT CHANGE UP MG/DL (ref 70–179)
GESTATIONAL GLUCOSE 2 HR (ADA): 127 MG/DL — SIGNIFICANT CHANGE UP (ref 70–152)
GTT 2H PNL SERPL: 92 MG/DL — HIGH (ref 70–91)

## 2022-08-12 NOTE — HISTORY OF PRESENT ILLNESS
[Postpartum Follow Up] : postpartum follow up [Delivery Date: ___] : on [unfilled] [Primary C/S] : delivered by  section [Male] : Delivery History: baby boy [Wt. ___] : weighing [unfilled] [Rhogam] : Rhogam was not administered [Rubella Vaccine] : Rubella vaccine was not administered [Pertussis Vaccine] : Pertussis vaccine was not administered [BTL] : no tubal ligation [Discharge HCT: ___] : hematocrit level was [unfilled] [Discharge HGB: ___] : hemoglobin level was [unfilled] [Resumed Menses] : has not resumed her menses [Resumed [place]] : has resumed intercourse [Intended Contraception] : Intended Contraception: [Oral Contraceptives] : oral contraceptives [None] : No associated symptoms are reported [Clean/Dry/Intact] : clean, dry and intact [Examination Of The Breasts] : breasts are normal [Doing Well] : is doing well [FreeTextEntry8] : S/P C/s for failed induction and PEC [FreeTextEntry9] : RH neg, GDMA, PEC [de-identified] : No Rhogam post partum, told she didn’t need injection, Inducduction of labor at 38 weeks 2 days due to PEC, failed induction arrest of labor [de-identified] : Bottle feeding [de-identified] : POP [de-identified] : Declined any pelvic exam [de-identified] : Post partum and contraceptive consult [de-identified] : Pt stopped Procardia about 3 weeks ago, Referral to Cardiology made, Neg pregnancy testing, Rx Progesterone only pill for contraception,0.35 mg, instructions given, declined condoms,  2 hr Gtt today, all questions answered.

## 2022-08-16 DIAGNOSIS — Z87.59 PERSONAL HISTORY OF OTHER COMPLICATIONS OF PREGNANCY, CHILDBIRTH AND THE PUERPERIUM: ICD-10-CM

## 2022-08-16 DIAGNOSIS — Z30.09 ENCOUNTER FOR OTHER GENERAL COUNSELING AND ADVICE ON CONTRACEPTION: ICD-10-CM

## 2022-08-24 LAB
HCG UR QL: NEGATIVE
QUALITY CONTROL: YES

## 2022-08-29 ENCOUNTER — APPOINTMENT (OUTPATIENT)
Dept: CARDIOLOGY | Facility: CLINIC | Age: 27
End: 2022-08-29

## 2022-08-29 ENCOUNTER — NON-APPOINTMENT (OUTPATIENT)
Age: 27
End: 2022-08-29

## 2022-08-29 VITALS
BODY MASS INDEX: 40.82 KG/M2 | DIASTOLIC BLOOD PRESSURE: 74 MMHG | WEIGHT: 245 LBS | SYSTOLIC BLOOD PRESSURE: 129 MMHG | OXYGEN SATURATION: 98 % | HEART RATE: 64 BPM | HEIGHT: 65 IN

## 2022-08-29 DIAGNOSIS — R07.89 OTHER CHEST PAIN: ICD-10-CM

## 2022-08-29 PROCEDURE — 99204 OFFICE O/P NEW MOD 45 MIN: CPT | Mod: 25

## 2022-08-29 PROCEDURE — 93000 ELECTROCARDIOGRAM COMPLETE: CPT

## 2022-08-29 NOTE — REASON FOR VISIT
[Symptom and Test Evaluation] : symptom and test evaluation [FreeTextEntry1] : This is a 27 year old woman s/p c section 5/29/22 in the setting of PEC. Now here for f/u visit for BP management. Pt self d/c'd procardia 30 mg and monitored her BP at home. Reports normal readings.\par Has mild mid sternal CP in the setting of anxiety, self limiting. \par \par SH: No smoking or alcohol\par FH: No CAD\par Exercise: Currently none\par Nutrition: Not following a nutrition plan\par \par 1. Schedule 2D echo. BP well controlled here.\par 2. The patient will keep a log of blood pressures at home, per protocol. We will review the log during the next visit.\par

## 2022-09-07 ENCOUNTER — APPOINTMENT (OUTPATIENT)
Dept: CV DIAGNOSITCS | Facility: HOSPITAL | Age: 27
End: 2022-09-07

## 2022-09-15 ENCOUNTER — NON-APPOINTMENT (OUTPATIENT)
Age: 27
End: 2022-09-15

## 2022-09-23 ENCOUNTER — EMERGENCY (EMERGENCY)
Facility: HOSPITAL | Age: 27
LOS: 1 days | Discharge: HOME | End: 2022-09-23
Attending: STUDENT IN AN ORGANIZED HEALTH CARE EDUCATION/TRAINING PROGRAM | Admitting: EMERGENCY MEDICINE

## 2022-09-23 VITALS
TEMPERATURE: 98 F | HEART RATE: 89 BPM | OXYGEN SATURATION: 100 % | RESPIRATION RATE: 18 BRPM | SYSTOLIC BLOOD PRESSURE: 132 MMHG | HEIGHT: 65 IN | DIASTOLIC BLOOD PRESSURE: 72 MMHG

## 2022-09-23 LAB
ALBUMIN SERPL ELPH-MCNC: 4.8 G/DL — SIGNIFICANT CHANGE UP (ref 3.3–5)
ALP SERPL-CCNC: 81 U/L — SIGNIFICANT CHANGE UP (ref 40–120)
ALT FLD-CCNC: 11 U/L — SIGNIFICANT CHANGE UP (ref 4–33)
ANION GAP SERPL CALC-SCNC: 14 MMOL/L — SIGNIFICANT CHANGE UP (ref 7–14)
APPEARANCE UR: CLEAR — SIGNIFICANT CHANGE UP
APTT BLD: 30.4 SEC — SIGNIFICANT CHANGE UP (ref 27–36.3)
AST SERPL-CCNC: 25 U/L — SIGNIFICANT CHANGE UP (ref 4–32)
BACTERIA # UR AUTO: NEGATIVE — SIGNIFICANT CHANGE UP
BASOPHILS # BLD AUTO: 0.04 K/UL — SIGNIFICANT CHANGE UP (ref 0–0.2)
BASOPHILS NFR BLD AUTO: 0.4 % — SIGNIFICANT CHANGE UP (ref 0–2)
BILIRUB SERPL-MCNC: 0.3 MG/DL — SIGNIFICANT CHANGE UP (ref 0.2–1.2)
BILIRUB UR-MCNC: NEGATIVE — SIGNIFICANT CHANGE UP
BLD GP AB SCN SERPL QL: NEGATIVE — SIGNIFICANT CHANGE UP
BUN SERPL-MCNC: 7 MG/DL — SIGNIFICANT CHANGE UP (ref 7–23)
CALCIUM SERPL-MCNC: 9.3 MG/DL — SIGNIFICANT CHANGE UP (ref 8.4–10.5)
CHLORIDE SERPL-SCNC: 104 MMOL/L — SIGNIFICANT CHANGE UP (ref 98–107)
CO2 SERPL-SCNC: 20 MMOL/L — LOW (ref 22–31)
COLOR SPEC: COLORLESS — SIGNIFICANT CHANGE UP
CREAT SERPL-MCNC: 0.56 MG/DL — SIGNIFICANT CHANGE UP (ref 0.5–1.3)
DIFF PNL FLD: ABNORMAL
EGFR: 130 ML/MIN/1.73M2 — SIGNIFICANT CHANGE UP
EOSINOPHIL # BLD AUTO: 0.07 K/UL — SIGNIFICANT CHANGE UP (ref 0–0.5)
EOSINOPHIL NFR BLD AUTO: 0.7 % — SIGNIFICANT CHANGE UP (ref 0–6)
GLUCOSE SERPL-MCNC: 97 MG/DL — SIGNIFICANT CHANGE UP (ref 70–99)
GLUCOSE UR QL: NEGATIVE — SIGNIFICANT CHANGE UP
HCT VFR BLD CALC: 25 % — LOW (ref 34.5–45)
HGB BLD-MCNC: 6.7 G/DL — CRITICAL LOW (ref 11.5–15.5)
IANC: 7.13 K/UL — SIGNIFICANT CHANGE UP (ref 1.8–7.4)
IMM GRANULOCYTES NFR BLD AUTO: 0.5 % — SIGNIFICANT CHANGE UP (ref 0–0.9)
INR BLD: 1.08 RATIO — SIGNIFICANT CHANGE UP (ref 0.88–1.16)
KETONES UR-MCNC: NEGATIVE — SIGNIFICANT CHANGE UP
LEUKOCYTE ESTERASE UR-ACNC: NEGATIVE — SIGNIFICANT CHANGE UP
LYMPHOCYTES # BLD AUTO: 1.6 K/UL — SIGNIFICANT CHANGE UP (ref 1–3.3)
LYMPHOCYTES # BLD AUTO: 17.1 % — SIGNIFICANT CHANGE UP (ref 13–44)
MAGNESIUM SERPL-MCNC: 2 MG/DL — SIGNIFICANT CHANGE UP (ref 1.6–2.6)
MCHC RBC-ENTMCNC: 19 PG — LOW (ref 27–34)
MCHC RBC-ENTMCNC: 26.8 GM/DL — LOW (ref 32–36)
MCV RBC AUTO: 70.8 FL — LOW (ref 80–100)
MONOCYTES # BLD AUTO: 0.47 K/UL — SIGNIFICANT CHANGE UP (ref 0–0.9)
MONOCYTES NFR BLD AUTO: 5 % — SIGNIFICANT CHANGE UP (ref 2–14)
NEUTROPHILS # BLD AUTO: 7.13 K/UL — SIGNIFICANT CHANGE UP (ref 1.8–7.4)
NEUTROPHILS NFR BLD AUTO: 76.3 % — SIGNIFICANT CHANGE UP (ref 43–77)
NITRITE UR-MCNC: NEGATIVE — SIGNIFICANT CHANGE UP
NRBC # BLD: 0 /100 WBCS — SIGNIFICANT CHANGE UP (ref 0–0)
NRBC # FLD: 0 K/UL — SIGNIFICANT CHANGE UP (ref 0–0)
PH UR: 6 — SIGNIFICANT CHANGE UP (ref 5–8)
PHOSPHATE SERPL-MCNC: 3.5 MG/DL — SIGNIFICANT CHANGE UP (ref 2.5–4.5)
PLATELET # BLD AUTO: 488 K/UL — HIGH (ref 150–400)
POTASSIUM SERPL-MCNC: 4 MMOL/L — SIGNIFICANT CHANGE UP (ref 3.5–5.3)
POTASSIUM SERPL-SCNC: 4 MMOL/L — SIGNIFICANT CHANGE UP (ref 3.5–5.3)
PROT SERPL-MCNC: 7.7 G/DL — SIGNIFICANT CHANGE UP (ref 6–8.3)
PROT UR-MCNC: NEGATIVE — SIGNIFICANT CHANGE UP
PROTHROM AB SERPL-ACNC: 12.5 SEC — SIGNIFICANT CHANGE UP (ref 10.5–13.4)
RBC # BLD: 3.53 M/UL — LOW (ref 3.8–5.2)
RBC # FLD: 17.6 % — HIGH (ref 10.3–14.5)
RBC CASTS # UR COMP ASSIST: SIGNIFICANT CHANGE UP /HPF (ref 0–4)
RH IG SCN BLD-IMP: NEGATIVE — SIGNIFICANT CHANGE UP
SODIUM SERPL-SCNC: 138 MMOL/L — SIGNIFICANT CHANGE UP (ref 135–145)
SP GR SPEC: 1 — LOW (ref 1.01–1.05)
TSH SERPL-MCNC: 1.13 UIU/ML — SIGNIFICANT CHANGE UP (ref 0.27–4.2)
UROBILINOGEN FLD QL: SIGNIFICANT CHANGE UP
WBC # BLD: 9.36 K/UL — SIGNIFICANT CHANGE UP (ref 3.8–10.5)
WBC # FLD AUTO: 9.36 K/UL — SIGNIFICANT CHANGE UP (ref 3.8–10.5)
WBC UR QL: SIGNIFICANT CHANGE UP /HPF (ref 0–5)

## 2022-09-23 PROCEDURE — 93010 ELECTROCARDIOGRAM REPORT: CPT

## 2022-09-23 PROCEDURE — 99291 CRITICAL CARE FIRST HOUR: CPT | Mod: 25

## 2022-09-23 RX ORDER — SODIUM CHLORIDE 9 MG/ML
1000 INJECTION INTRAMUSCULAR; INTRAVENOUS; SUBCUTANEOUS ONCE
Refills: 0 | Status: COMPLETED | OUTPATIENT
Start: 2022-09-23 | End: 2022-09-23

## 2022-09-23 RX ADMIN — SODIUM CHLORIDE 1000 MILLILITER(S): 9 INJECTION INTRAMUSCULAR; INTRAVENOUS; SUBCUTANEOUS at 20:50

## 2022-09-23 NOTE — ED PROVIDER NOTE - OBJECTIVE STATEMENT
26 yo f s/p c section 5/29/22, c/b preeclampsia, pw fatigue. reports periods resumed 8/26, has had bleeding since then, known by ob, cleared in last week. in last few days has had fatigue. also r sided ear discomfort. mild r sided ha. denies vision changes, paresthesias, f/c, cp, sob, n/v. saw at urgent care, sent here for r/o anemia.

## 2022-09-23 NOTE — ED CDU PROVIDER INITIAL DAY NOTE - NS ED ATTENDING STATEMENT MOD
This was a shared visit with the SANJUANITA. I reviewed and verified the documentation and independently performed the documented:

## 2022-09-23 NOTE — ED ADULT NURSE NOTE - CHIEF COMPLAINT QUOTE
Pt AOX4 c/o mild headache and hearing "whooshing sounds" in Right sight of head; sent from [facility] for eval:  c/o intermittent  light-headedness and dizziness  Pt had   was delivered early due to pre-eclampsia; had first PP period  and has bled heavily since then without stopping.  Bleeding only recently got lighter in past few days, pt onb.c. pills since 22

## 2022-09-23 NOTE — ED CDU PROVIDER INITIAL DAY NOTE - ATTENDING APP SHARED VISIT CONTRIBUTION OF CARE
Marcellus Nunes DO:  patient seen and evaluated with the PA.  I was present for key portions of the History & Physical, and I agree with the Impression & Plan. Marcellus Nunes DO: 26 yo f s/p c section 5/29/22, c/b preeclampsia, pw fatigue. reports periods resumed 8/26, has had bleeding since then, known by ob, cleared in last week. in last few days has had fatigue. also r sided ear discomfort. mild r sided ha. denies vision changes, paresthesias, f/c, cp, sob, n/v. saw at urgent care, sent here for r/o anemia. arrives hds, well appearing, neurovascualry intact. pt defers gu exam as bleeding has improved. found to be anemic, amenable to transfusion. cdu for further monitoring.

## 2022-09-23 NOTE — ED ADULT NURSE REASSESSMENT NOTE - NS ED NURSE REASSESS COMMENT FT1
Pt resting comfortably in stretcher, vitals as charted. Pt educated about blood transfusion and potential s/s of adverse reaction. Blood transfusion started at this time. Respirations are even and unlabored.

## 2022-09-23 NOTE — ED PROVIDER NOTE - PROGRESS NOTE DETAILS
Marcellus Nunes DO: Patient reassessed, NAD, non-toxic appearing. results dw pt/family, questions answered. cdu for transfusion. start w/ 1 u prbc, reassess.

## 2022-09-23 NOTE — ED PROVIDER NOTE - CRITICAL CARE ATTENDING CONTRIBUTION TO CARE
Marcellus Nunes DO: I have personally provided the amount of critical care time documented below, excluding time spent on separate procedures.

## 2022-09-23 NOTE — ED PROVIDER NOTE - CLINICAL SUMMARY MEDICAL DECISION MAKING FREE TEXT BOX
Marcellus Nunes, DO: 28 yo f s/p c section 5/29/22, c/b preeclampsia, pw fatigue. reports periods resumed 8/26, has had bleeding since then, known by ob, cleared in last week. in last few days has had fatigue. also r sided ear discomfort. mild r sided ha. denies vision changes, paresthesias, f/c, cp, sob, n/v. saw at urgent care, sent here for r/o anemia. arrives hds, well appearing, neurovascualry intact. pt defers gu exam as bleeding has improved. do not suspect ich, or sheehans. will check labs, treat sx, reassess.

## 2022-09-23 NOTE — ED CDU PROVIDER INITIAL DAY NOTE - PHYSICAL EXAMINATION
CONSTITUTIONAL:  Well appearing, awake, alert, oriented to person, place, time/situation and in no apparent distress.  Pt. is objectively comfortable appearing and verbalizing in full, clear, effortless sentences.  ENMT: NC/AT.  Airway patent.  Nasal mucosa clear.  Moist mucous membranes.  Neck supple.  EYES:  Clear OU.  CARDIAC:  Normal rate, regular rhythm.  Heart sounds S1 S2.  No murmurs, gallops, or rubs.  RESPIRATORY:  Breath sounds clear and equal bilaterally.  No wheezes, no rales, no rhonchi.  GASTROINTESTINAL:  Abdomen soft, non-distended, non-tender.  No rebound, no guarding.  NEUROLOGICAL:  Alert and oriented to person/place/time/situation.  No gross deficits, no tremors.  Gait stable.  MUSCULOSKELETAL:  Range of motion is not limited.    SKIN:  Skin color unremarkable.  Skin warm, dry, and intact.    PSYCHIATRIC:  Alert and oriented to person/place/time/situation.  Mood and affect WNL.  No apparent risk to self or others.

## 2022-09-23 NOTE — ED ADULT TRIAGE NOTE - CHIEF COMPLAINT QUOTE
Pt AOX4 c/o mild headache and hearing "whooshing sounds" in Right sight of head; sent from [facility] for eval:  c/o intermittent  light-headedness and dizziness  Pt had   was delivered early due to pre-eclampsia; had first PP period  and has bled heavily since then without stopping.  Bleeding only recently got lighter in past few days, pt onb.c. pills since 22

## 2022-09-23 NOTE — ED PROVIDER NOTE - PHYSICAL EXAMINATION
General: well appearing, interactive, well nourished, no apparent distress, ncat  HEENT: EOMI, PERRLA, normal mucosa, normal oropharynx, no lesions on the lips or on oral mucosa, normal external ear, tympanic membranes pearly gray b/l   Neck: supple, no lymphadenopathy, full range of motion, no nuchal rigidity  CV: RRR, normal S1 and S2 with no murmur, capillary refill less than two seconds, pp 2+   Resp: lungs CTA b/l, good aeration bilaterally, symmetric chest wall   Abd: non-distended, soft, non-tender  : no CVA tenderness  MSK: full range of motion, no cyanosis, no edema, no clubbing, no immobility  Neuro: CN2-12 grossly intact. EOMI. 5/5 strength in UE and LE b/l.  Sensation intact in UE/LE b/l b/l.  No dysdiadochokinesia. Gait nl   Skin: no rashes, skin intact

## 2022-09-23 NOTE — ED CDU PROVIDER INITIAL DAY NOTE - NSICDXPASTMEDICALHX_GEN_ALL_CORE_FT
PAST MEDICAL HISTORY:  Gestational diabetes, diet controlled      PAST MEDICAL HISTORY:  Gestational diabetes, diet controlled     Pre-eclampsia ( 22)

## 2022-09-23 NOTE — ED PROVIDER NOTE - NS ED ROS FT
GENERAL: No fever or chills, //             EYES: no change in vision, //             HEENT ear discomfort  //             CARDIAC: no chest pain, //              PULMONARY: no cough or SOB, //             GI: no abdominal pain, no nausea or no vomiting, no diarrhea or constipation, //             : No changes in urination,  //            SKIN: no rashes,  //            NEURO:  headache,  //             MSK: No joint pain otherwise as HPI or negative. ~Marcellus Nunes, DO

## 2022-09-23 NOTE — ED CDU PROVIDER INITIAL DAY NOTE - OBJECTIVE STATEMENT
28 yo f s/p c section 22, c/b preeclampsia, pw fatigue. reports periods resumed , has had bleeding since then, known by ob, cleared in last week. in last few days has had fatigue. also r sided ear discomfort. mild r sided ha. denies vision changes, paresthesias, f/c, cp, sob, n/v. saw at urgent care, sent here for r/o anemia.    CDU WOLF [place] Note----  ED Provider HPI as above, reviewed.  Pt is a 28 yo female, s/p  22 c/b preeclampsia, vaginal bleeding since , following closely with her Ob.  Pt presented to the ED c/o generalized fatigue, weakness; VSS; Hb 6.7.  Pt was consented for PRBC transfusion and dispo'd to CDU accordingly.

## 2022-09-24 VITALS
RESPIRATION RATE: 16 BRPM | SYSTOLIC BLOOD PRESSURE: 142 MMHG | HEART RATE: 76 BPM | OXYGEN SATURATION: 100 % | DIASTOLIC BLOOD PRESSURE: 86 MMHG

## 2022-09-24 LAB
BASOPHILS # BLD AUTO: 0.04 K/UL — SIGNIFICANT CHANGE UP (ref 0–0.2)
BASOPHILS NFR BLD AUTO: 0.6 % — SIGNIFICANT CHANGE UP (ref 0–2)
EOSINOPHIL # BLD AUTO: 0.11 K/UL — SIGNIFICANT CHANGE UP (ref 0–0.5)
EOSINOPHIL NFR BLD AUTO: 1.5 % — SIGNIFICANT CHANGE UP (ref 0–6)
FLUAV AG NPH QL: SIGNIFICANT CHANGE UP
FLUBV AG NPH QL: SIGNIFICANT CHANGE UP
HCT VFR BLD CALC: 30 % — LOW (ref 34.5–45)
HGB BLD-MCNC: 8.9 G/DL — LOW (ref 11.5–15.5)
IANC: 4.8 K/UL — SIGNIFICANT CHANGE UP (ref 1.8–7.4)
IMM GRANULOCYTES NFR BLD AUTO: 0.4 % — SIGNIFICANT CHANGE UP (ref 0–0.9)
LYMPHOCYTES # BLD AUTO: 1.85 K/UL — SIGNIFICANT CHANGE UP (ref 1–3.3)
LYMPHOCYTES # BLD AUTO: 25.4 % — SIGNIFICANT CHANGE UP (ref 13–44)
MCHC RBC-ENTMCNC: 21.9 PG — LOW (ref 27–34)
MCHC RBC-ENTMCNC: 29.7 GM/DL — LOW (ref 32–36)
MCV RBC AUTO: 73.7 FL — LOW (ref 80–100)
MONOCYTES # BLD AUTO: 0.44 K/UL — SIGNIFICANT CHANGE UP (ref 0–0.9)
MONOCYTES NFR BLD AUTO: 6.1 % — SIGNIFICANT CHANGE UP (ref 2–14)
NEUTROPHILS # BLD AUTO: 4.8 K/UL — SIGNIFICANT CHANGE UP (ref 1.8–7.4)
NEUTROPHILS NFR BLD AUTO: 66 % — SIGNIFICANT CHANGE UP (ref 43–77)
NRBC # BLD: 0 /100 WBCS — SIGNIFICANT CHANGE UP (ref 0–0)
NRBC # FLD: 0.02 K/UL — HIGH (ref 0–0)
PLATELET # BLD AUTO: 495 K/UL — HIGH (ref 150–400)
RBC # BLD: 4.07 M/UL — SIGNIFICANT CHANGE UP (ref 3.8–5.2)
RBC # FLD: 18.8 % — HIGH (ref 10.3–14.5)
RSV RNA NPH QL NAA+NON-PROBE: SIGNIFICANT CHANGE UP
SARS-COV-2 RNA SPEC QL NAA+PROBE: SIGNIFICANT CHANGE UP
WBC # BLD: 7.27 K/UL — SIGNIFICANT CHANGE UP (ref 3.8–10.5)
WBC # FLD AUTO: 7.27 K/UL — SIGNIFICANT CHANGE UP (ref 3.8–10.5)

## 2022-09-24 PROCEDURE — 71046 X-RAY EXAM CHEST 2 VIEWS: CPT | Mod: 26

## 2022-09-24 PROCEDURE — 99217: CPT

## 2022-09-24 NOTE — ED CDU PROVIDER SUBSEQUENT DAY NOTE - PROGRESS NOTE DETAILS
Pt medically stable for discharge.  Strict return precautions given.  Pt to follow up with PMD and OB/GYN.  Pt reported a brief episode of "funny feeling in chest."  No sob, palpitations, lightheadedness.  Not clinically concerning for ACS or PE.  EKG nonactionable.  Lungs clear to auscultation.  No respiratory distress.  Reassessment performed and plan for discharge discussed with Dr. Hale who agrees with disposition and discharge plan. pt feeling anxious, has hx of anxiety panic attack. plan ekg and cxr. vss no resp distress Pt medically stable for discharge.  Strict return precautions given.  Pt to follow up with PMD and OB/GYN.  Pt reported a brief episode of "funny feeling in chest."  No sob, palpitations, lightheadedness.  Not clinically concerning for ACS or PE.  EKG nonactionable.  Lungs clear to auscultation.  No respiratory distress.  CXR nonactionable.  Reassessment performed and plan for discharge discussed with Dr. Hale who agrees with disposition and discharge plan. pt feeling better, no cp or sob. cxr wnl. pt feels comfortable going home

## 2022-09-24 NOTE — ED CDU PROVIDER SUBSEQUENT DAY NOTE - ATTENDING APP SHARED VISIT CONTRIBUTION OF CARE
Attending Statement: I have reviewed and agree with all pertinent clinical information, including history and physical exam and agree with treatment plan of the PA, except as noted.  27yoF sp  on 22 presented to ED fo fatigue and malaise, found to be anemic to 6.7  Transfused two units of prbc, no complications. repeat hemoglobin 8.9   Denies cp/sob/not lightheaded, "just tired" no abdominal pain no fever  Vital signs noted. well appearing female. normal S1-S2 No resp distress. able to speak in full and clear sentences. no wheeze, rales or stridor. soft nt abdomen  plan dc home w close pmd follow up

## 2022-09-24 NOTE — ED CDU PROVIDER SUBSEQUENT DAY NOTE - MEDICAL DECISION MAKING DETAILS
Pt is a 28 y/o F PMHx  22 p/w fatigue and mild generalized headaches x days -- anemia likely 2/2 abnormal uterine bleeding which has now resolved -- pt appropriate for discharge at this time

## 2022-09-24 NOTE — ED CDU PROVIDER DISPOSITION NOTE - NSFOLLOWUPINSTRUCTIONS_ED_ALL_ED_FT
Advance activity as tolerated.  Continue all previously prescribed medications as directed unless otherwise instructed.  Follow up with your primary care physician and OB/GYN (referral list provided or call [phone] to make an appointment with the OB/GYN clinic) in 48-72 hours- bring copies of your results.  Return to the ER for worsening or persistent symptoms, including but not limited to worsening/persistent lightheadedness, chest pain, shortness of breath, passing out, heavy vaginal bleeding requiring more than or equal to 2 pads in 1 hour, and/or ANY NEW OR CONCERNING SYMPTOMS. If you have issues obtaining follow up, please call: [phone] to obtain a doctor or specialist who takes your insurance in your area.  You may call [phone] to make an appointment with the internal medicine clinic.

## 2022-09-24 NOTE — ED CDU PROVIDER SUBSEQUENT DAY NOTE - HISTORY
Pt is a 28 y/o F PMHx  22 p/w fatigue and mild generalized headaches x days.  Pt reports no head trauma, mild, waxing and waning headaches associated with tiredness.  Pt found to be anemic.  pt reports she has had abnormal uterine bleeding, which resolved yesterday.  Denies any fevers, chills, numbness, weakness, changes in vision/hearing, head trauma, use of blood thinners, LOC, dizziness, passing out, chest pain, SOB, difficulty walking.  Pt placed in CDU for blood transfusion.  Hgb improved to 8.9.  Pt reports resolution of symptoms.

## 2022-09-24 NOTE — ED CDU PROVIDER DISPOSITION NOTE - PATIENT PORTAL LINK FT
You can access the FollowMyHealth Patient Portal offered by [facility] by registering at the following website: [email or web address]. By joining [employer]’s FollowMyHealth portal, you will also be able to view your health information using other applications (apps) compatible with our system.

## 2022-09-24 NOTE — ED CDU PROVIDER DISPOSITION NOTE - CLINICAL COURSE
Pt is a 28 y/o F PMHx  22 p/w fatigue and mild generalized headaches x days.  Pt reports no head trauma, mild, waxing and waning headaches associated with tiredness.  Pt found to be anemic.  pt reports she has had abnormal uterine bleeding, which resolved yesterday.  Denies any fevers, chills, numbness, weakness, changes in vision/hearing, head trauma, use of blood thinners, LOC, dizziness, passing out, chest pain, SOB, difficulty walking.  Pt placed in CDU for blood transfusion.  Hgb improved to 8.9.  Pt reports resolution of symptoms.  Pt was deemed medically stable for discharge with instructions to follow up with PMD and OB/GYN.  Pt to continue taking her iron supplements.

## 2022-10-04 ENCOUNTER — APPOINTMENT (OUTPATIENT)
Dept: OBGYN | Facility: HOSPITAL | Age: 27
End: 2022-10-04

## 2022-10-21 PROBLEM — O14.90 UNSPECIFIED PRE-ECLAMPSIA, UNSPECIFIED TRIMESTER: Chronic | Status: ACTIVE | Noted: 2022-09-24

## 2022-10-22 ENCOUNTER — EMERGENCY (EMERGENCY)
Facility: HOSPITAL | Age: 27
LOS: 1 days | Discharge: HOME | End: 2022-10-22
Attending: STUDENT IN AN ORGANIZED HEALTH CARE EDUCATION/TRAINING PROGRAM | Admitting: STUDENT IN AN ORGANIZED HEALTH CARE EDUCATION/TRAINING PROGRAM

## 2022-10-22 VITALS
TEMPERATURE: 98 F | OXYGEN SATURATION: 100 % | DIASTOLIC BLOOD PRESSURE: 55 MMHG | RESPIRATION RATE: 16 BRPM | HEART RATE: 84 BPM | HEIGHT: 65 IN | SYSTOLIC BLOOD PRESSURE: 125 MMHG

## 2022-10-22 LAB
ALBUMIN SERPL ELPH-MCNC: 4.1 G/DL — SIGNIFICANT CHANGE UP (ref 3.3–5)
ALP SERPL-CCNC: 91 U/L — SIGNIFICANT CHANGE UP (ref 40–120)
ALT FLD-CCNC: 14 U/L — SIGNIFICANT CHANGE UP (ref 4–33)
ANION GAP SERPL CALC-SCNC: 11 MMOL/L — SIGNIFICANT CHANGE UP (ref 7–14)
APTT BLD: 31.7 SEC — SIGNIFICANT CHANGE UP (ref 27–36.3)
AST SERPL-CCNC: 22 U/L — SIGNIFICANT CHANGE UP (ref 4–32)
BASOPHILS # BLD AUTO: 0.04 K/UL — SIGNIFICANT CHANGE UP (ref 0–0.2)
BASOPHILS NFR BLD AUTO: 0.5 % — SIGNIFICANT CHANGE UP (ref 0–2)
BILIRUB SERPL-MCNC: 0.2 MG/DL — SIGNIFICANT CHANGE UP (ref 0.2–1.2)
BLD GP AB SCN SERPL QL: NEGATIVE — SIGNIFICANT CHANGE UP
BUN SERPL-MCNC: 10 MG/DL — SIGNIFICANT CHANGE UP (ref 7–23)
CALCIUM SERPL-MCNC: 9.1 MG/DL — SIGNIFICANT CHANGE UP (ref 8.4–10.5)
CHLORIDE SERPL-SCNC: 105 MMOL/L — SIGNIFICANT CHANGE UP (ref 98–107)
CO2 SERPL-SCNC: 23 MMOL/L — SIGNIFICANT CHANGE UP (ref 22–31)
CREAT SERPL-MCNC: 0.5 MG/DL — SIGNIFICANT CHANGE UP (ref 0.5–1.3)
EGFR: 133 ML/MIN/1.73M2 — SIGNIFICANT CHANGE UP
EOSINOPHIL # BLD AUTO: 0.11 K/UL — SIGNIFICANT CHANGE UP (ref 0–0.5)
EOSINOPHIL NFR BLD AUTO: 1.4 % — SIGNIFICANT CHANGE UP (ref 0–6)
GLUCOSE SERPL-MCNC: 111 MG/DL — HIGH (ref 70–99)
HCG SERPL-ACNC: <5 MIU/ML — SIGNIFICANT CHANGE UP
HCT VFR BLD CALC: 31.4 % — LOW (ref 34.5–45)
HGB BLD-MCNC: 8.9 G/DL — LOW (ref 11.5–15.5)
IANC: 5.85 K/UL — SIGNIFICANT CHANGE UP (ref 1.8–7.4)
IMM GRANULOCYTES NFR BLD AUTO: 0.4 % — SIGNIFICANT CHANGE UP (ref 0–0.9)
INR BLD: 1.02 RATIO — SIGNIFICANT CHANGE UP (ref 0.88–1.16)
LIDOCAIN IGE QN: 38 U/L — SIGNIFICANT CHANGE UP (ref 7–60)
LYMPHOCYTES # BLD AUTO: 1.48 K/UL — SIGNIFICANT CHANGE UP (ref 1–3.3)
LYMPHOCYTES # BLD AUTO: 18.6 % — SIGNIFICANT CHANGE UP (ref 13–44)
MAGNESIUM SERPL-MCNC: 2 MG/DL — SIGNIFICANT CHANGE UP (ref 1.6–2.6)
MCHC RBC-ENTMCNC: 20.8 PG — LOW (ref 27–34)
MCHC RBC-ENTMCNC: 28.3 GM/DL — LOW (ref 32–36)
MCV RBC AUTO: 73.4 FL — LOW (ref 80–100)
MONOCYTES # BLD AUTO: 0.44 K/UL — SIGNIFICANT CHANGE UP (ref 0–0.9)
MONOCYTES NFR BLD AUTO: 5.5 % — SIGNIFICANT CHANGE UP (ref 2–14)
NEUTROPHILS # BLD AUTO: 5.85 K/UL — SIGNIFICANT CHANGE UP (ref 1.8–7.4)
NEUTROPHILS NFR BLD AUTO: 73.6 % — SIGNIFICANT CHANGE UP (ref 43–77)
NRBC # BLD: 0 /100 WBCS — SIGNIFICANT CHANGE UP (ref 0–0)
NRBC # FLD: 0 K/UL — SIGNIFICANT CHANGE UP (ref 0–0)
PLATELET # BLD AUTO: 485 K/UL — HIGH (ref 150–400)
POTASSIUM SERPL-MCNC: 4.2 MMOL/L — SIGNIFICANT CHANGE UP (ref 3.5–5.3)
POTASSIUM SERPL-SCNC: 4.2 MMOL/L — SIGNIFICANT CHANGE UP (ref 3.5–5.3)
PROT SERPL-MCNC: 7.5 G/DL — SIGNIFICANT CHANGE UP (ref 6–8.3)
PROTHROM AB SERPL-ACNC: 11.8 SEC — SIGNIFICANT CHANGE UP (ref 10.5–13.4)
RBC # BLD: 4.28 M/UL — SIGNIFICANT CHANGE UP (ref 3.8–5.2)
RBC # FLD: 19.5 % — HIGH (ref 10.3–14.5)
RH IG SCN BLD-IMP: NEGATIVE — SIGNIFICANT CHANGE UP
SARS-COV-2 RNA SPEC QL NAA+PROBE: SIGNIFICANT CHANGE UP
SODIUM SERPL-SCNC: 139 MMOL/L — SIGNIFICANT CHANGE UP (ref 135–145)
WBC # BLD: 7.95 K/UL — SIGNIFICANT CHANGE UP (ref 3.8–10.5)
WBC # FLD AUTO: 7.95 K/UL — SIGNIFICANT CHANGE UP (ref 3.8–10.5)

## 2022-10-22 PROCEDURE — 99285 EMERGENCY DEPT VISIT HI MDM: CPT

## 2022-10-22 RX ORDER — SODIUM CHLORIDE 9 MG/ML
1000 INJECTION INTRAMUSCULAR; INTRAVENOUS; SUBCUTANEOUS ONCE
Refills: 0 | Status: COMPLETED | OUTPATIENT
Start: 2022-10-22 | End: 2022-10-22

## 2022-10-22 RX ADMIN — SODIUM CHLORIDE 1000 MILLILITER(S): 9 INJECTION INTRAMUSCULAR; INTRAVENOUS; SUBCUTANEOUS at 15:04

## 2022-10-22 NOTE — ED PROVIDER NOTE - NS ED ROS FT
CONST: no fevers, no chills, +dizziness  ENT: no sore throat  CV: no chest pain, no leg swelling  RESP: no shortness of breath, no cough  ABD: no abdominal pain, no nausea, no vomiting, no diarrhea  : no dysuria, no flank pain, no hematuria  MSK: no back pain, no extremity pain  SKIN:  no rash

## 2022-10-22 NOTE — ED PROVIDER NOTE - PATIENT PORTAL LINK FT
You can access the FollowMyHealth Patient Portal offered by [facility] by registering at the following website: [email or web address]. By joining [employer]’s FollowMyHealth portal, you will also be able to view your health information using other applications (apps) compatible with our system.

## 2022-10-22 NOTE — ED PROVIDER NOTE - PROGRESS NOTE DETAILS
Amari NESS (PGY-3)  labs nonactionable. Hb stable. pt HDS. will d/c pt to home with gyn f/u and return precautions

## 2022-10-22 NOTE — ED PROVIDER NOTE - CLINICAL SUMMARY MEDICAL DECISION MAKING FREE TEXT BOX
27F  (delivered in May, complicated by preeclampsia) PMH DUB (unclear cause) p/w lightheadedness since yest in setting of vag bleeding. States she has had vag spotting since 10/8, bleeding worsened yest where she was changing pad every 2 hrs but not soaking thru the whole pad.

## 2022-10-22 NOTE — ED PROVIDER NOTE - ATTENDING CONTRIBUTION TO CARE
26 yo F  s/p CS 2022 (pregnancy c/b pre-eclampsia), p/w c/o dizziness in the setting of vaginal bleeding. Pt reports spotting since 10/08, now with two days of heavy bleeding. Changed pads every two hours yesterday (not completely soaking through pads), now with decreasing bleeding today. Started on OCPs by ob/gyn last month when she presented to ED for similar heavy vaginal bleeding at that time, requiring one unit pRBC. On exam, abd soft nt/nd, well appearing, no subconjunctival pallor, vss. Plan for labs including cbc, t&s, hcg, will tx with IVF-- plan for transfusion if hemoglobin < 7 vs dc home with return recs

## 2022-10-22 NOTE — ED ADULT TRIAGE NOTE - CHIEF COMPLAINT QUOTE
pt states had baby in may and after cooper needed blood transfusion  .pt states started bleeding again oct 8 and having heavy bleeding again feels week and dizzy like she felt  prior to blood transfusion

## 2022-10-22 NOTE — ED ADULT NURSE REASSESSMENT NOTE - NS ED NURSE REASSESS COMMENT FT1
US guided PIV placement.  Indication - poor access.  Findings: compressible left forearm vein.  Using direct US guidance, under clean conditions, a 18g catheter introduced into vessel.  US performed after procedure, functional catheter in vein, no complications.  Dark blood noted from port.  flushed with no resistance.  patient tolerated procedure well.  Sterile dressing applied.  Impression:  successful US guided 18g PIV placement.

## 2022-10-22 NOTE — ED PROVIDER NOTE - OBJECTIVE STATEMENT
27F  (delivered in May, complicated by preeclampsia) PMH DUB (unclear cause) p/w lightheadedness since yest in setting of vag bleeding. States she has had vag spotting since 10/8, bleeding worsened yest where she was changing pad every 2 hrs but not soaking thru the whole pad. Bleeding is less today but now she feels dizzy and weak. No f/c, cp/sob, abd pain, leg swelling. Has appt with [facility] OB clinic in a few weeks but came to ED due to systemic symptoms today. Was in ED a few weeks ago where she required 2u prbc

## 2022-10-22 NOTE — ED PROVIDER NOTE - NSFOLLOWUPINSTRUCTIONS_ED_ALL_ED_FT
You were evaluated today for vaginal bleeding. You did not require a blood transfusion today    Follow up with your OB/GYN clinic in 2-3 days for further evaluation of your symptoms    Return to the Emergency Department if you have any new or worsening symptoms, including but not limited to weakness, chest pain, difficulty breathing, large clots, or soaking through a pad every 2 hours

## 2022-10-22 NOTE — ED ADULT NURSE NOTE - OBJECTIVE STATEMENT
Jessica RN: A&Ox4, , s/p C/S on , presents to ED c/o vaginal bleeding since 10/8, with the last few days being heavy and bleeding through 1 pad in less than an hour associated with weakness. Pt states last month she bleed from  to  and went to ED and was transfused 2 units of PRBCs. Respirations are even and unlabored, sating at 100% on RA, covid swab sent.

## 2022-11-09 ENCOUNTER — APPOINTMENT (OUTPATIENT)
Dept: OBGYN | Facility: HOSPITAL | Age: 27
End: 2022-11-09

## 2023-01-23 NOTE — ED PROVIDER NOTE - PHYSICAL EXAMINATION
Airway       Patient location during procedure: OR       Procedure Start/Stop Times: 1/23/2023 7:38 AM  Staff -        CRNA: Arsen Martinez APRN CRNA       Performed By: CRNA  Consent for Airway        Urgency: elective  Indications and Patient Condition       Indications for airway management: kimberli-procedural       Induction type:intravenous       Mask difficulty assessment: 1 - vent by mask    Final Airway Details       Final airway type: endotracheal airway       Successful airway: ETT - single  Endotracheal Airway Details        ETT size (mm): 7.0       Cuffed: yes       Cuff volume (mL): 5       Successful intubation technique: direct laryngoscopy       DL Blade Type: Day 2       Grade View of Cords: 1       Position: Right       Measured from: gums/teeth       Secured at (cm): 21       Bite block used: None    Post intubation assessment        Placement verified by: capnometry and equal breath sounds        Number of attempts at approach: 1       Number of other approaches attempted: 0       Secured with: plastic tape       Ease of procedure: easy       Dentition: Intact and Unchanged    Medication(s) Administered   Medication Administration Time: 1/23/2023 7:38 AM       Physical Exam:  Gen: awake alert   HEENT: oral mucosa moist  Lung: CTAB, no respiratory distress, no wheezes/rhonchi/rales B/L, speaking in full sentences  CV: RRR  Abd: soft, NT, ND, no guarding, no rigidity, no rebound tenderness  MSK: no visible deformities  Skin: Warm, well perfused, no rash, no leg swelling  ~Jackson Fitzpatrick MD (PGY-3)

## 2023-05-09 ENCOUNTER — EMERGENCY (EMERGENCY)
Facility: HOSPITAL | Age: 28
LOS: 1 days | Discharge: HOME | End: 2023-05-09
Attending: EMERGENCY MEDICINE | Admitting: EMERGENCY MEDICINE
Payer: MEDICAID

## 2023-05-09 VITALS
TEMPERATURE: 98 F | RESPIRATION RATE: 16 BRPM | SYSTOLIC BLOOD PRESSURE: 137 MMHG | HEART RATE: 84 BPM | OXYGEN SATURATION: 99 % | DIASTOLIC BLOOD PRESSURE: 63 MMHG

## 2023-05-09 VITALS
RESPIRATION RATE: 16 BRPM | OXYGEN SATURATION: 100 % | DIASTOLIC BLOOD PRESSURE: 70 MMHG | TEMPERATURE: 99 F | HEART RATE: 76 BPM | SYSTOLIC BLOOD PRESSURE: 132 MMHG

## 2023-05-09 LAB
ALBUMIN SERPL ELPH-MCNC: 4.3 G/DL — SIGNIFICANT CHANGE UP (ref 3.3–5)
ALP SERPL-CCNC: 77 U/L — SIGNIFICANT CHANGE UP (ref 40–120)
ALT FLD-CCNC: 14 U/L — SIGNIFICANT CHANGE UP (ref 4–33)
ANION GAP SERPL CALC-SCNC: 13 MMOL/L — SIGNIFICANT CHANGE UP (ref 7–14)
AST SERPL-CCNC: 13 U/L — SIGNIFICANT CHANGE UP (ref 4–32)
BASOPHILS # BLD AUTO: 0.05 K/UL — SIGNIFICANT CHANGE UP (ref 0–0.2)
BASOPHILS NFR BLD AUTO: 0.6 % — SIGNIFICANT CHANGE UP (ref 0–2)
BILIRUB SERPL-MCNC: 0.2 MG/DL — SIGNIFICANT CHANGE UP (ref 0.2–1.2)
BLD GP AB SCN SERPL QL: NEGATIVE — SIGNIFICANT CHANGE UP
BUN SERPL-MCNC: 9 MG/DL — SIGNIFICANT CHANGE UP (ref 7–23)
CALCIUM SERPL-MCNC: 9.6 MG/DL — SIGNIFICANT CHANGE UP (ref 8.4–10.5)
CHLORIDE SERPL-SCNC: 105 MMOL/L — SIGNIFICANT CHANGE UP (ref 98–107)
CO2 SERPL-SCNC: 21 MMOL/L — LOW (ref 22–31)
CREAT SERPL-MCNC: 0.56 MG/DL — SIGNIFICANT CHANGE UP (ref 0.5–1.3)
EGFR: 130 ML/MIN/1.73M2 — SIGNIFICANT CHANGE UP
EOSINOPHIL # BLD AUTO: 0.07 K/UL — SIGNIFICANT CHANGE UP (ref 0–0.5)
EOSINOPHIL NFR BLD AUTO: 0.8 % — SIGNIFICANT CHANGE UP (ref 0–6)
GLUCOSE SERPL-MCNC: 108 MG/DL — HIGH (ref 70–99)
HCG SERPL-ACNC: <1 MIU/ML — SIGNIFICANT CHANGE UP
HCT VFR BLD CALC: 30 % — LOW (ref 34.5–45)
HGB BLD-MCNC: 8 G/DL — LOW (ref 11.5–15.5)
IANC: 6.26 K/UL — SIGNIFICANT CHANGE UP (ref 1.8–7.4)
IMM GRANULOCYTES NFR BLD AUTO: 0.5 % — SIGNIFICANT CHANGE UP (ref 0–0.9)
LYMPHOCYTES # BLD AUTO: 1.96 K/UL — SIGNIFICANT CHANGE UP (ref 1–3.3)
LYMPHOCYTES # BLD AUTO: 22.1 % — SIGNIFICANT CHANGE UP (ref 13–44)
MAGNESIUM SERPL-MCNC: 2 MG/DL — SIGNIFICANT CHANGE UP (ref 1.6–2.6)
MCHC RBC-ENTMCNC: 17 PG — LOW (ref 27–34)
MCHC RBC-ENTMCNC: 26.7 GM/DL — LOW (ref 32–36)
MCV RBC AUTO: 63.7 FL — LOW (ref 80–100)
MONOCYTES # BLD AUTO: 0.47 K/UL — SIGNIFICANT CHANGE UP (ref 0–0.9)
MONOCYTES NFR BLD AUTO: 5.3 % — SIGNIFICANT CHANGE UP (ref 2–14)
NEUTROPHILS # BLD AUTO: 6.26 K/UL — SIGNIFICANT CHANGE UP (ref 1.8–7.4)
NEUTROPHILS NFR BLD AUTO: 70.7 % — SIGNIFICANT CHANGE UP (ref 43–77)
NRBC # BLD: 0 /100 WBCS — SIGNIFICANT CHANGE UP (ref 0–0)
NRBC # FLD: 0 K/UL — SIGNIFICANT CHANGE UP (ref 0–0)
PHOSPHATE SERPL-MCNC: 3.7 MG/DL — SIGNIFICANT CHANGE UP (ref 2.5–4.5)
PLATELET # BLD AUTO: 447 K/UL — HIGH (ref 150–400)
POTASSIUM SERPL-MCNC: 4.2 MMOL/L — SIGNIFICANT CHANGE UP (ref 3.5–5.3)
POTASSIUM SERPL-SCNC: 4.2 MMOL/L — SIGNIFICANT CHANGE UP (ref 3.5–5.3)
PROT SERPL-MCNC: 7.2 G/DL — SIGNIFICANT CHANGE UP (ref 6–8.3)
RBC # BLD: 4.71 M/UL — SIGNIFICANT CHANGE UP (ref 3.8–5.2)
RBC # FLD: 18.5 % — HIGH (ref 10.3–14.5)
RH IG SCN BLD-IMP: NEGATIVE — SIGNIFICANT CHANGE UP
SODIUM SERPL-SCNC: 139 MMOL/L — SIGNIFICANT CHANGE UP (ref 135–145)
TSH SERPL-MCNC: 2.59 UIU/ML — SIGNIFICANT CHANGE UP (ref 0.27–4.2)
WBC # BLD: 8.85 K/UL — SIGNIFICANT CHANGE UP (ref 3.8–10.5)
WBC # FLD AUTO: 8.85 K/UL — SIGNIFICANT CHANGE UP (ref 3.8–10.5)

## 2023-05-09 PROCEDURE — 99223 1ST HOSP IP/OBS HIGH 75: CPT

## 2023-05-09 PROCEDURE — 93010 ELECTROCARDIOGRAM REPORT: CPT

## 2023-05-09 RX ORDER — SODIUM CHLORIDE 9 MG/ML
1000 INJECTION INTRAMUSCULAR; INTRAVENOUS; SUBCUTANEOUS ONCE
Refills: 0 | Status: COMPLETED | OUTPATIENT
Start: 2023-05-09 | End: 2023-05-09

## 2023-05-09 RX ADMIN — SODIUM CHLORIDE 1000 MILLILITER(S): 9 INJECTION INTRAMUSCULAR; INTRAVENOUS; SUBCUTANEOUS at 04:09

## 2023-05-09 RX ADMIN — SODIUM CHLORIDE 1000 MILLILITER(S): 9 INJECTION INTRAMUSCULAR; INTRAVENOUS; SUBCUTANEOUS at 07:43

## 2023-05-09 NOTE — ED CDU PROVIDER DISPOSITION NOTE - CLINICAL COURSE
27yF c/o weakness, tired x 3-4 days.  No bleeding, fever, n/v/d.  Pt has been under a lot of stressors with little sleep- has an 11 month old- states not the type to ask  or mother in law for help.  NO si, hi, ah, vh.  Pt has also been dieting x days- only one meal and less fluids x days. Labs show Hgb 8.0, cmp within normal. known hx of anemia. Pt reports improvement in symptoms following IV hydration, requesting discharge. Pt is well appearing, vitals stable, will f/u with her PMD. Discussed strict return precautions. 27yF c/o weakness, tired x 3-4 days.  No bleeding, fever, n/v/d.  Pt has been under a lot of stressors with little sleep- has an 11 month old- states not the type to ask  or mother in law for help.  NO si, hi, ah, vh.  Pt has also been dieting x days- only one meal and less fluids x days. Labs show Hgb 8.0, cmp within normal. known hx of anemia. Pt reports improvement in symptoms following IV hydration, requesting discharge. Pt is well appearing, vitals stable, will f/u with her PMD and hematology. Discussed strict return precautions.

## 2023-05-09 NOTE — ED CDU PROVIDER INITIAL DAY NOTE - PROGRESS NOTE DETAILS
Pt reports improvement in symptoms following IV fluids, will d/c home with pmd follow up. Pt is well appearing, ambulating in ED without difficulty, vitals stable. Discharge discussed with . Pt reports improvement in symptoms following IV fluids, will d/c home with pmd follow up. Pt is well appearing, ambulating in ED without difficulty, vitals stable. Pt continues to deny SI/HI, depression, states she was given information/resources for mental health by her obgyn if she needs, does not feel she needs these services at this time. Discharge discussed with .

## 2023-05-09 NOTE — ED ADULT TRIAGE NOTE - CHIEF COMPLAINT QUOTE
Pt brought in by [facility] unit 52H. Pt c/o generalized weakness, chest pain, dizziness and decreased PO intake x4 days. Denies any associated LOC, diaphoresis, sob, nausea, vomiting, diarrhea or fevers. PMHx low iron. LMP 4/18. EKG in progress.

## 2023-05-09 NOTE — ED ADULT NURSE REASSESSMENT NOTE - NS ED NURSE REASSESS COMMENT FT1
report received from overnight RN. A&Ox4. ambulatory. NAD. pt denies SOB, chest pain, dizziness, weakness, urinary symptoms, HA, n/v/d, fevers, chills. respirations are even and un labored. skin intact. safety precautions maintained.

## 2023-05-09 NOTE — ED CDU PROVIDER DISPOSITION NOTE - NSFOLLOWUPINSTRUCTIONS_ED_ALL_ED_FT
Follow-up with your primary care doctor within 1 week.  Drink plenty of fluids.  Return to the ER with any worsening or concerning symptoms, weakness, dizziness, chest pain, shortness of breath, abdominal pain, fever/chills or any other concerns. Follow-up with your primary care doctor within 1 week.  Follow up with a hematologist within 1 week, referral list attached please call to make an appointment  Drink plenty of fluids.  Return to the ER with any worsening or concerning symptoms, weakness, dizziness, chest pain, shortness of breath, abdominal pain, fever/chills or any other concerns.

## 2023-05-09 NOTE — ED PROVIDER NOTE - PROGRESS NOTE DETAILS
PT improved, reconsidering blood transfusion- will give more IVF and reassess- agrees to observation

## 2023-05-09 NOTE — ED ADULT NURSE NOTE - OBJECTIVE STATEMENT
Received pt in room 13. A&Ox4, ambulatory at baseline, PMH low iron, brought in by [facility] unit 52H c/o generalized weakness, chest pain, dizziness and decreased PO intake x4 days. Denies any associated LOC, diaphoresis, sob, nausea, vomiting, diarrhea or fevers. LMP 4/18. VSS. RR even and unlabored. 20g placed in right hand. Labs sent. Medication given. Awaiting further orders from provider.

## 2023-05-09 NOTE — ED CDU PROVIDER DISPOSITION NOTE - ATTENDING CONTRIBUTION TO CARE
I performed a face to face evaluation of this patient and obtained a history and performed a full exam.  I agree with the history, physical exam and plan of the PA.  27yF c/o weakness, tired x 3-4 days.  No bleeding, fever, n/v/d.  Pt has been under a lot of stressors with little sleep- has an 11 month old- states not the type to ask  or mother in law for help.  NO si, hi, ah, vh.  Pt has also been dieting x days- only one meal and less fluids x days. Labs show Hgb 8.0, cmp within normal. known hx of anemia. Pt reports improvement in symptoms following IV hydration, requesting discharge. Pt is well appearing, vitals stable, will f/u with her PMD and hematology. Discussed strict return precautions.  Pt awake, appears tired but otherwise well, heart and lungs wnl, neuro wnl, and abdomen soft, nontender

## 2023-05-09 NOTE — ED CDU PROVIDER INITIAL DAY NOTE - ATTENDING APP SHARED VISIT CONTRIBUTION OF CARE
I performed a face to face evaluation of this patient and obtained a history and performed a full exam.  I agree with the history, physical exam and plan of the PA.  Pt with generalized weakness with normal exam, no bleeding, depressed affect, has been under stress- found to have mild anemia.  OP also dry heart, lungs, abd and neuro wnl - for ivf reassess, feed, consider transfusion, allow to sleep- pt requires advanced workup and consideration of transfusion

## 2023-05-09 NOTE — ED CDU PROVIDER DISPOSITION NOTE - PATIENT PORTAL LINK FT
You can access the FollowMyHealth Patient Portal offered by [facility] by registering at the following website: [email or web address]. By joining [employer]’s FollowMyHealth portal, you will also be able to view your health information using other applications (apps) compatible with our system.

## 2023-05-09 NOTE — ED PROVIDER NOTE - DIFFERENTIAL DIAGNOSIS
Differential Diagnosis anemia, sleep deprivation, electrolyte abnormalities, situational stress, arrythmia

## 2023-05-09 NOTE — ED PROVIDER NOTE - PHYSICAL EXAMINATION
CONSTITUTIONAL: Well appearing, well nourished, awake, alert, oriented to person, place, time/situation  ENMT: Airway patent  EYES: Clear bilaterally  CARDIAC: Normal rate, regular rhythm.  RESPIRATORY: Breath sounds clear and equal bilaterally.  ABDOMEN: Abdomen soft, non-tender, no guarding  MUSCULOSKELETAL: Spine appears normal, no deformities, equal active FROM bilaterally  NEUROLOGIC: CN II-XII grossly intact, moves all extremities without lateralization  SKIN: Exposed skin  pale, warm, dry and intact

## 2023-05-09 NOTE — ED ADULT NURSE NOTE - CHIEF COMPLAINT QUOTE
Pt brought in by [facility] unit 52H. Pt c/o generalized weakness, chest pain, dizziness and decreased PO intake x4 days. Denies any associated LOC, diaphoresis, sob, nausea, vomiting, diarrhea or fevers. PMHx low iron. LMP 4/18. EKG in progress.

## 2023-05-09 NOTE — ED CDU PROVIDER INITIAL DAY NOTE - CLINICAL SUMMARY MEDICAL DECISION MAKING FREE TEXT BOX
Pt with generalized weakness with normal exam, no bleeding, depressed affect, has been under stress- found to have mild anemia.  OP also dry heart, lungs, abd and neuro wnl - for ivf reassess, feed, consider transfusion, allow to sleep- pt requires advanced workup and consideration of transfusion

## 2023-05-09 NOTE — ED CDU PROVIDER INITIAL DAY NOTE - OBJECTIVE STATEMENT
PT c/o weakness, tired x 3-4 days.  No bleeding, fever, n/v/d.  Pt has been under a lot of stressors with little sleep- has an 11 month old- states not the type to ask  or mother in law for help.  NO si, hi, ah, vh.  Pt has also been dieting x days- only one meal and less fluids x days.

## 2023-05-09 NOTE — ED PROVIDER NOTE - NS ED ATTENDING STATEMENT MOD
TRANSFER - OUT REPORT: 
 
Verbal report given to SARITA Ravi(name) on Tiffany Sifuentes  being transferred to [facility](unit) for routine progression of care Report consisted of patients Situation, Background, Assessment and  
Recommendations(SBAR). Information from the following report(s) SBAR, ED Summary, MAR, Recent Results and Cardiac Rhythm Sinus Tach was reviewed with the receiving nurse. Lines:  
Peripheral IV 04/05/19 Left Antecubital (Active) Site Assessment Clean, dry, & intact 4/5/2019  3:50 PM  
Phlebitis Assessment 0 4/5/2019  3:50 PM  
Infiltration Assessment 0 4/5/2019  3:50 PM  
Dressing Status Clean, dry, & intact 4/5/2019  3:50 PM  
Dressing Type Transparent;Tape 4/5/2019  3:50 PM  
Hub Color/Line Status Blue;Flushed 4/5/2019  3:50 PM  
Alcohol Cap Used Yes 4/5/2019  3:50 PM  
   
Peripheral IV 04/05/19 Left Wrist (Active) Site Assessment Clean, dry, & intact 4/5/2019  3:50 PM  
Phlebitis Assessment 0 4/5/2019  3:50 PM  
Infiltration Assessment 0 4/5/2019  3:50 PM  
Dressing Status Clean, dry, & intact 4/5/2019  3:50 PM  
Dressing Type Transparent;Tape 4/5/2019  3:50 PM  
Hub Color/Line Status Pink; Infusing 4/5/2019  3:50 PM  
Action Taken Open ports on tubing capped 4/5/2019  3:50 PM  
Alcohol Cap Used Yes 4/5/2019  3:50 PM  
  
 
Opportunity for questions and clarification was provided. Patient transported with: 
 Monitor Registered Nurse Attending with Attending Only

## 2023-05-09 NOTE — ED PROVIDER NOTE - OBJECTIVE STATEMENT
27F PMH of anemia of pregnancy p/w several weeks of progressive weakness a/w some shortness of breath. No chest pain. No fever. No HA. Pt notes she has an 11 month who has kept her from sleeping well and has recently been setting up for a party.

## 2023-05-09 NOTE — ED ADULT NURSE REASSESSMENT NOTE - NS ED NURSE REASSESS COMMENT FT1
Pt verbalizes improvement of symptoms. Endorsing fatigue. Denies CP, SOB, nausea, vomiting, headache or vision changes. Respirations even and unlabored. No acute distress noted. Speaking in full and complete sentences. NO acute distress noted. Appears laying comfortably in stretcher. Lights dimmed for comfort. Fluids running as ordered. Bed in lowest position, call bell in reach, wheels locked, safety maintained. Awaiting further orders.

## 2023-05-10 ENCOUNTER — EMERGENCY (EMERGENCY)
Facility: HOSPITAL | Age: 28
LOS: 1 days | Discharge: HOME | End: 2023-05-10
Attending: EMERGENCY MEDICINE | Admitting: STUDENT IN AN ORGANIZED HEALTH CARE EDUCATION/TRAINING PROGRAM
Payer: MEDICAID

## 2023-05-10 VITALS
OXYGEN SATURATION: 100 % | TEMPERATURE: 99 F | RESPIRATION RATE: 18 BRPM | DIASTOLIC BLOOD PRESSURE: 76 MMHG | HEART RATE: 77 BPM | SYSTOLIC BLOOD PRESSURE: 142 MMHG

## 2023-05-10 LAB
ALBUMIN SERPL ELPH-MCNC: 5 G/DL — SIGNIFICANT CHANGE UP (ref 3.3–5)
ALP SERPL-CCNC: 93 U/L — SIGNIFICANT CHANGE UP (ref 40–120)
ALT FLD-CCNC: 11 U/L — SIGNIFICANT CHANGE UP (ref 4–33)
ANION GAP SERPL CALC-SCNC: 15 MMOL/L — HIGH (ref 7–14)
AST SERPL-CCNC: 18 U/L — SIGNIFICANT CHANGE UP (ref 4–32)
BASOPHILS # BLD AUTO: 0.04 K/UL — SIGNIFICANT CHANGE UP (ref 0–0.2)
BASOPHILS NFR BLD AUTO: 0.6 % — SIGNIFICANT CHANGE UP (ref 0–2)
BILIRUB SERPL-MCNC: 0.3 MG/DL — SIGNIFICANT CHANGE UP (ref 0.2–1.2)
BLD GP AB SCN SERPL QL: NEGATIVE — SIGNIFICANT CHANGE UP
BUN SERPL-MCNC: 6 MG/DL — LOW (ref 7–23)
CALCIUM SERPL-MCNC: 10.1 MG/DL — SIGNIFICANT CHANGE UP (ref 8.4–10.5)
CHLORIDE SERPL-SCNC: 103 MMOL/L — SIGNIFICANT CHANGE UP (ref 98–107)
CO2 SERPL-SCNC: 21 MMOL/L — LOW (ref 22–31)
CREAT SERPL-MCNC: 0.58 MG/DL — SIGNIFICANT CHANGE UP (ref 0.5–1.3)
D DIMER BLD IA.RAPID-MCNC: <150 NG/ML DDU — SIGNIFICANT CHANGE UP
EGFR: 129 ML/MIN/1.73M2 — SIGNIFICANT CHANGE UP
EOSINOPHIL # BLD AUTO: 0.08 K/UL — SIGNIFICANT CHANGE UP (ref 0–0.5)
EOSINOPHIL NFR BLD AUTO: 1.1 % — SIGNIFICANT CHANGE UP (ref 0–6)
GLUCOSE SERPL-MCNC: 88 MG/DL — SIGNIFICANT CHANGE UP (ref 70–99)
HCT VFR BLD CALC: 34.5 % — SIGNIFICANT CHANGE UP (ref 34.5–45)
HGB BLD-MCNC: 9.2 G/DL — LOW (ref 11.5–15.5)
IANC: 5.22 K/UL — SIGNIFICANT CHANGE UP (ref 1.8–7.4)
IMM GRANULOCYTES NFR BLD AUTO: 0.1 % — SIGNIFICANT CHANGE UP (ref 0–0.9)
LYMPHOCYTES # BLD AUTO: 1.29 K/UL — SIGNIFICANT CHANGE UP (ref 1–3.3)
LYMPHOCYTES # BLD AUTO: 18.2 % — SIGNIFICANT CHANGE UP (ref 13–44)
MCHC RBC-ENTMCNC: 17.1 PG — LOW (ref 27–34)
MCHC RBC-ENTMCNC: 26.7 GM/DL — LOW (ref 32–36)
MCV RBC AUTO: 64.2 FL — LOW (ref 80–100)
MONOCYTES # BLD AUTO: 0.46 K/UL — SIGNIFICANT CHANGE UP (ref 0–0.9)
MONOCYTES NFR BLD AUTO: 6.5 % — SIGNIFICANT CHANGE UP (ref 2–14)
NEUTROPHILS # BLD AUTO: 5.22 K/UL — SIGNIFICANT CHANGE UP (ref 1.8–7.4)
NEUTROPHILS NFR BLD AUTO: 73.5 % — SIGNIFICANT CHANGE UP (ref 43–77)
NRBC # BLD: 0 /100 WBCS — SIGNIFICANT CHANGE UP (ref 0–0)
NRBC # FLD: 0 K/UL — SIGNIFICANT CHANGE UP (ref 0–0)
PLATELET # BLD AUTO: 529 K/UL — HIGH (ref 150–400)
POTASSIUM SERPL-MCNC: 4 MMOL/L — SIGNIFICANT CHANGE UP (ref 3.5–5.3)
POTASSIUM SERPL-SCNC: 4 MMOL/L — SIGNIFICANT CHANGE UP (ref 3.5–5.3)
PROT SERPL-MCNC: 8.9 G/DL — HIGH (ref 6–8.3)
RBC # BLD: 5.37 M/UL — HIGH (ref 3.8–5.2)
RBC # FLD: 18.8 % — HIGH (ref 10.3–14.5)
RH IG SCN BLD-IMP: NEGATIVE — SIGNIFICANT CHANGE UP
SODIUM SERPL-SCNC: 139 MMOL/L — SIGNIFICANT CHANGE UP (ref 135–145)
TROPONIN T, HIGH SENSITIVITY RESULT: <6 NG/L — SIGNIFICANT CHANGE UP
WBC # BLD: 7.1 K/UL — SIGNIFICANT CHANGE UP (ref 3.8–10.5)
WBC # FLD AUTO: 7.1 K/UL — SIGNIFICANT CHANGE UP (ref 3.8–10.5)

## 2023-05-10 PROCEDURE — 99223 1ST HOSP IP/OBS HIGH 75: CPT

## 2023-05-10 PROCEDURE — 71046 X-RAY EXAM CHEST 2 VIEWS: CPT | Mod: 26

## 2023-05-10 PROCEDURE — 93010 ELECTROCARDIOGRAM REPORT: CPT

## 2023-05-10 RX ORDER — LANOLIN ALCOHOL/MO/W.PET/CERES
6 CREAM (GRAM) TOPICAL AT BEDTIME
Refills: 0 | Status: DISCONTINUED | OUTPATIENT
Start: 2023-05-10 | End: 2023-05-14

## 2023-05-10 RX ORDER — ACETAMINOPHEN 500 MG
975 TABLET ORAL ONCE
Refills: 0 | Status: COMPLETED | OUTPATIENT
Start: 2023-05-10 | End: 2023-05-10

## 2023-05-10 RX ADMIN — Medication 6 MILLIGRAM(S): at 23:05

## 2023-05-10 RX ADMIN — Medication 975 MILLIGRAM(S): at 23:03

## 2023-05-10 NOTE — ED PROCEDURE NOTE - ATTENDING CONTRIBUTION TO CARE
Gonalicia: I have seen and examined the patient face to face.  I was present during the above procedure and  have reviewed and addended the procedure note.

## 2023-05-10 NOTE — ED ADULT TRIAGE NOTE - CHIEF COMPLAINT QUOTE
C/O CP, palpitaion, increase generalized weakness.  PT A&OX4.  HX- Anemia, .  No SOB noted.  Breathing non-labored.  PT states she was in this ED here for the same reason.  PT ambulatory PT arrived to ED C/O CP, palpitations, increase generalized weakness.  PT A&OX4.  HX- Anemia, .  No SOB noted.  Breathing non-labored.  PT states she was in this ED here for the same reason.  PT ambulatory.  PT alert and responsive.  PT able to move all extremities.

## 2023-05-10 NOTE — ED PROVIDER NOTE - PROGRESS NOTE DETAILS
WOLF Goldman: Labs revealed hemoglobin 9.2 hematocrit 34.5, dimer negative, troponin negative.  Since patient still has palpitations and murmur heard on exam will send patient to CDU for telemetry monitoring, echo and telemetry doc evaluation tomorrow.

## 2023-05-10 NOTE — ED ADULT NURSE REASSESSMENT NOTE - NSFALLUNIVINTERV_ED_ALL_ED
Bed/Stretcher in lowest position, wheels locked, appropriate side rails in place/Call bell, personal items and telephone in reach/Instruct patient to call for assistance before getting out of bed/chair/stretcher/Non-slip footwear applied when patient is off stretcher/[place] to call system/Physically safe environment - no spills, clutter or unnecessary equipment/Purposeful proactive rounding/Room/bathroom lighting operational, light cord in reach

## 2023-05-10 NOTE — ED PROVIDER NOTE - OBJECTIVE STATEMENT
27-year-old female history of anemia, presents to ED complaining of weakness, palpitations, fatigue, left-sided intermittent chest pain worsening over the past few days.  Patient was seen in ED last night was given IV fluids initially was offered 1 unit of PRBCs however her hemoglobin was 8 and hematocrit 30 and patient felt better at the time of discharge.  She states when she woke up this morning started to have the same symptoms again.  No recent travel, leg swelling.  Patient is on OCPs.  Denies any shortness of breath, fever, cough, vaginal bleeding, abdominal pain, vomiting.

## 2023-05-10 NOTE — ED PROVIDER NOTE - CLINICAL SUMMARY MEDICAL DECISION MAKING FREE TEXT BOX
27-year-old female history of anemia, presents to ED complaining of weakness, palpitations, fatigue, left-sided intermittent chest pain worsening over the past few days.  Patient was seen in ED last night was given IV fluids initially was offered 1 unit of PRBCs however her hemoglobin was 8 and hematocrit 30 and patient felt better at the time of discharge.  She states when she woke up this morning started to have the same symptoms again.  No recent travel, leg swelling.  Patient is on OCPs.  Denies any shortness of breath, fever, cough, vaginal bleeding, abdominal pain, vomiting.    On exam patient noted to have pale conjunctive, heart murmur heard on exam.  R/o symptomatic anemia, PE, cardiac pathology given murmur and palpitations.  Will check labs, dimer, troponin, chest x-ray and reassess.

## 2023-05-10 NOTE — ED ADULT NURSE NOTE - NSFALLUNIVINTERV_ED_ALL_ED
Bed/Stretcher in lowest position, wheels locked, appropriate side rails in place/Call bell, personal items and telephone in reach/Instruct patient to call for assistance before getting out of bed/chair/stretcher/Non-slip footwear applied when patient is off stretcher/[place] to call system/Physically safe environment - no spills, clutter or unnecessary equipment/Purposeful proactive rounding/Room/bathroom lighting operational, light cord in reach

## 2023-05-10 NOTE — ED ADULT NURSE NOTE - OBJECTIVE STATEMENT
BREAK RN: pt. received to int. 7 A&Ox4 ambulatory pmh of anemia. pt. endorsing x2 days of CP, decreased PO intake and generalized malaise. Endorses CP as non-radiating L sided. NAD noted. respirations even and unlabored on RA. pending lab draw. comfort measures provided. safety precautions maintained. BREAK RN: pt. received to int. 7 A&Ox4 ambulatory pmh of anemia. pt. endorsing x2 days of CP, decreased PO intake and generalized malaise. Endorses CP as non-radiating L sided. NAD noted. respirations even and unlabored on RA. pending lab draw. comfort measures provided. safety precautions maintained.   20 g U/S guided line in Rt AC placed by resident.

## 2023-05-10 NOTE — ED ADULT NURSE NOTE - CHIEF COMPLAINT QUOTE
PT arrived to ED C/O CP, palpitations, increase generalized weakness.  PT A&OX4.  HX- Anemia, .  No SOB noted.  Breathing non-labored.  PT states she was in this ED here for the same reason.  PT ambulatory.  PT alert and responsive.  PT able to move all extremities.

## 2023-05-10 NOTE — ED PROVIDER NOTE - ATTENDING APP SHARED VISIT CONTRIBUTION OF CARE
Gong: I have seen and examined the patient face to face, have reviewed and addended the HPI, PE and a/p as necessary.     26 yo F with anemia, a/w weakness, palpitations, fatigue, left-sided intermittent chest pain worsening over the past few days.  Pt was seen yesterday and was given IVF and offered 1 unit PRBC however patient felt better after IVF and was discharged home.  Pt reports feeling now L sided chest pain.  Noted to be on OCPs.  Denies recent travel, leg swelling.  Denies any shortness of breath, fever, cough, vaginal bleeding, abdominal pain, vomiting.    GEN - NAD; well appearing; A+O x3; non-toxic appearing  CARD -s1s2, RRR, no M,G,R;   PULM - CTA b/l, symmetric breath sounds;   ABD -  +BS, ND, NT, soft, no guarding, no rebound, no masses;   BACK - no CVA tenderness, Normal  spine;   EXT - symmetric pulses, 2+ dp, capillary refill < 2 seconds, no cyanosis, no edema;   NEURO - no focal neuro deficits, no slurred speech    26 yo F with anemia, a/w weakness, palpitations, fatigue, left-sided intermittent chest pain worsening over the past few days. Concern for possible PE, less likely ACS given history, will check d-dimer.  May require CDU obs for echo.  Check cbc, cmp, trop, d-dimer, ekg.  Reassess.

## 2023-05-11 VITALS
OXYGEN SATURATION: 100 % | SYSTOLIC BLOOD PRESSURE: 110 MMHG | DIASTOLIC BLOOD PRESSURE: 78 MMHG | RESPIRATION RATE: 18 BRPM | TEMPERATURE: 98 F | HEART RATE: 72 BPM

## 2023-05-11 LAB
ALBUMIN SERPL ELPH-MCNC: 3.9 G/DL — SIGNIFICANT CHANGE UP (ref 3.3–5)
ALP SERPL-CCNC: 70 U/L — SIGNIFICANT CHANGE UP (ref 40–120)
ALT FLD-CCNC: 8 U/L — SIGNIFICANT CHANGE UP (ref 4–33)
ANION GAP SERPL CALC-SCNC: 12 MMOL/L — SIGNIFICANT CHANGE UP (ref 7–14)
APPEARANCE UR: ABNORMAL
AST SERPL-CCNC: 11 U/L — SIGNIFICANT CHANGE UP (ref 4–32)
BACTERIA # UR AUTO: NEGATIVE — SIGNIFICANT CHANGE UP
BASOPHILS # BLD AUTO: 0.05 K/UL — SIGNIFICANT CHANGE UP (ref 0–0.2)
BASOPHILS NFR BLD AUTO: 0.8 % — SIGNIFICANT CHANGE UP (ref 0–2)
BILIRUB SERPL-MCNC: 0.2 MG/DL — SIGNIFICANT CHANGE UP (ref 0.2–1.2)
BILIRUB UR-MCNC: NEGATIVE — SIGNIFICANT CHANGE UP
BUN SERPL-MCNC: 12 MG/DL — SIGNIFICANT CHANGE UP (ref 7–23)
CALCIUM SERPL-MCNC: 8.9 MG/DL — SIGNIFICANT CHANGE UP (ref 8.4–10.5)
CHLORIDE SERPL-SCNC: 107 MMOL/L — SIGNIFICANT CHANGE UP (ref 98–107)
CO2 SERPL-SCNC: 22 MMOL/L — SIGNIFICANT CHANGE UP (ref 22–31)
COD CRY URNS QL: ABNORMAL
COLOR SPEC: YELLOW — SIGNIFICANT CHANGE UP
CREAT SERPL-MCNC: 0.59 MG/DL — SIGNIFICANT CHANGE UP (ref 0.5–1.3)
DIFF PNL FLD: NEGATIVE — SIGNIFICANT CHANGE UP
EGFR: 128 ML/MIN/1.73M2 — SIGNIFICANT CHANGE UP
EOSINOPHIL # BLD AUTO: 0.12 K/UL — SIGNIFICANT CHANGE UP (ref 0–0.5)
EOSINOPHIL NFR BLD AUTO: 2 % — SIGNIFICANT CHANGE UP (ref 0–6)
EPI CELLS # UR: 4 /HPF — SIGNIFICANT CHANGE UP (ref 0–5)
GLUCOSE SERPL-MCNC: 98 MG/DL — SIGNIFICANT CHANGE UP (ref 70–99)
GLUCOSE UR QL: NEGATIVE — SIGNIFICANT CHANGE UP
HCT VFR BLD CALC: 28.5 % — LOW (ref 34.5–45)
HGB BLD-MCNC: 7.6 G/DL — LOW (ref 11.5–15.5)
HYALINE CASTS # UR AUTO: SIGNIFICANT CHANGE UP (ref 0–7)
IANC: 3.15 K/UL — SIGNIFICANT CHANGE UP (ref 1.8–7.4)
IMM GRANULOCYTES NFR BLD AUTO: 0.3 % — SIGNIFICANT CHANGE UP (ref 0–0.9)
KETONES UR-MCNC: NEGATIVE — SIGNIFICANT CHANGE UP
LEUKOCYTE ESTERASE UR-ACNC: ABNORMAL
LYMPHOCYTES # BLD AUTO: 2.13 K/UL — SIGNIFICANT CHANGE UP (ref 1–3.3)
LYMPHOCYTES # BLD AUTO: 36.2 % — SIGNIFICANT CHANGE UP (ref 13–44)
MCHC RBC-ENTMCNC: 16.7 PG — LOW (ref 27–34)
MCHC RBC-ENTMCNC: 26.7 GM/DL — LOW (ref 32–36)
MCV RBC AUTO: 62.6 FL — LOW (ref 80–100)
MONOCYTES # BLD AUTO: 0.42 K/UL — SIGNIFICANT CHANGE UP (ref 0–0.9)
MONOCYTES NFR BLD AUTO: 7.1 % — SIGNIFICANT CHANGE UP (ref 2–14)
NEUTROPHILS # BLD AUTO: 3.15 K/UL — SIGNIFICANT CHANGE UP (ref 1.8–7.4)
NEUTROPHILS NFR BLD AUTO: 53.6 % — SIGNIFICANT CHANGE UP (ref 43–77)
NITRITE UR-MCNC: NEGATIVE — SIGNIFICANT CHANGE UP
NRBC # BLD: 0 /100 WBCS — SIGNIFICANT CHANGE UP (ref 0–0)
NRBC # FLD: 0 K/UL — SIGNIFICANT CHANGE UP (ref 0–0)
PH UR: 6 — SIGNIFICANT CHANGE UP (ref 5–8)
PLATELET # BLD AUTO: 413 K/UL — HIGH (ref 150–400)
POTASSIUM SERPL-MCNC: 3.7 MMOL/L — SIGNIFICANT CHANGE UP (ref 3.5–5.3)
POTASSIUM SERPL-SCNC: 3.7 MMOL/L — SIGNIFICANT CHANGE UP (ref 3.5–5.3)
PROT SERPL-MCNC: 6.8 G/DL — SIGNIFICANT CHANGE UP (ref 6–8.3)
PROT UR-MCNC: ABNORMAL
RBC # BLD: 4.55 M/UL — SIGNIFICANT CHANGE UP (ref 3.8–5.2)
RBC # FLD: 17.9 % — HIGH (ref 10.3–14.5)
RBC CASTS # UR COMP ASSIST: 13 /HPF — HIGH (ref 0–4)
SODIUM SERPL-SCNC: 141 MMOL/L — SIGNIFICANT CHANGE UP (ref 135–145)
SP GR SPEC: 1.04 — SIGNIFICANT CHANGE UP (ref 1.01–1.05)
UROBILINOGEN FLD QL: SIGNIFICANT CHANGE UP
WBC # BLD: 5.89 K/UL — SIGNIFICANT CHANGE UP (ref 3.8–10.5)
WBC # FLD AUTO: 5.89 K/UL — SIGNIFICANT CHANGE UP (ref 3.8–10.5)
WBC UR QL: 28 /HPF — HIGH (ref 0–5)

## 2023-05-11 PROCEDURE — 76376 3D RENDER W/INTRP POSTPROCES: CPT | Mod: 26

## 2023-05-11 PROCEDURE — 99238 HOSP IP/OBS DSCHRG MGMT 30/<: CPT

## 2023-05-11 PROCEDURE — 93306 TTE W/DOPPLER COMPLETE: CPT | Mod: 26

## 2023-05-11 RX ADMIN — Medication 975 MILLIGRAM(S): at 00:03

## 2023-05-11 NOTE — ED CDU PROVIDER DISPOSITION NOTE - NSFOLLOWUPINSTRUCTIONS_ED_ALL_ED_FT
You were seen in the Emergency room for chest pain and had a normal work up. Please follow up with your doctor/cardiology as discussed.  _______________________    You should call for an ambulance (in the [place] and [place], call 9-1-1) if the pain:    ?Is new or severe    ?Happens along with shortness of breath    ?Lasts more than a few minutes    ?Gets worse when you walk, climb stairs, or do other types of physical activity    ?Scares or worries you    Having chest pain does not necessarily mean you are having a heart attack. Most people who go to the emergency department with chest pain are not having a heart attack. Their pain is usually caused by less serious problems, such as muscle pain, heartburn, or anxiety. Even so, you should not take any chances.    People often delay seeking help for a heart attack because they think the symptoms are not serious or will go away. When they do that, they risk permanent damage to their heart – or even death.    Is chest pain the only important symptom of a heart attack?  No. Other symptoms are important, too. Sometimes people do not go to the hospital because they do not have any pain at all. But it is possible to have a heart attack without pain. This is more likely in females, people with diabetes, and people older than 60.    It is important to pay attention to any of the symptoms of a heart attack (figure 1), which can include:    ?Pain, pressure, or discomfort in the center of the chest    ?Pain or discomfort in other parts of the upper body, including the shoulders, arms, back, neck, jaw, or stomach    ?Shortness of breath    ?Nausea, vomiting, burping, or heartburn    ?Sweating or having cold, clammy skin    ?A racing or uneven heart rate    ?Feeling dizzy or lightheaded or even fainting    These symptoms are important if they last more than a few minutes or keep happening over and over (coming and going). If you think you might be having a heart attack, call for an ambulance (in the [place] and [place], call 9-1-1) right away. Do not try to get to the hospital on your own.    Is heart attack the only cause of chest pain?  No. Chest pain can be caused by lots of other problems, including:    ?Heart problems other than heart attacks, such as infection around the heart    ?Muscle soreness after an activity that involves the chest muscles    ?Diseases that cause pain, such as arthritis    ?Shingles (herpes zoster), a condition linked to the chickenpox virus that also causes a painful rash    ?Any kind of injury to the chest, including surgery    ?Digestive problems such as heartburn, acid reflux, stomach ulcers, or irritable bowel syndrome    ?Problems affecting the lungs, such as pneumonia (an infection in the lungs) or blood clots in the lungs    ?Psychological problems, such as panic disorder or depression    ?Weakening of the lining of the big blood vessel in the chest (called the aorta)    What will happen if I go to the emergency department?  The staff in the emergency department will examine you and then run tests to try to find the cause of your pain. But don't be surprised if you do not find out right away why you have pain. The cause of chest pain is not always easy to find. Even so, doctors can usually tell if your heart is in trouble.    Some tests are done right away in the emergency department. The goal is to figure out as quickly as possible if something serious is causing your chest pain. Other tests are done after this, in the hospital.    Tests might include:    ?An electrocardiogram (ECG) – This test measures the electrical activity in your heart (figure 2). It can help doctors find out if you are having a heart attack.    ?Blood tests – During a heart attack, the heart releases certain chemicals. If these chemicals are in your blood, it might mean you are having a heart attack.    ?A chest X-ray – This can show some of the problems that can cause chest pain.    ?A stress test – During a stress test, you might be asked to run or walk on a treadmill while you also have an ECG (figure 3). Physical activity increases the heart's need for blood. This test helps doctors see if the heart is getting enough blood. If you cannot walk or run, your doctor might do this test by giving you a medicine to make your heart pump faster.    ?Cardiac catheterization (also called "cardiac cath") – During this test, the doctor puts a thin tube into a blood vessel in your leg or arm. Then they move the tube up to your heart. Next, the doctor puts a dye that shows up on X-ray into the tube. This part of the test is called "coronary angiography." It can show whether any of the arteries in your heart are clogged.    ?Echocardiogram – This test uses sound waves to create an image of your heart as it beats. During a heart attack, not all parts or the heart pump normally.    ?A CT scan – This is a special kind of X-ray. Your doctor might use this to look at the blood vessels going to your heart.    What if I am having a heart attack?  If you are having a heart attack, the doctor will give you treatments to reduce the damage to your heart and relieve your pain.    The sooner you get treated for a heart attack, the better treatment will work. Every minute counts when it comes to keeping your heart muscle alive!

## 2023-05-11 NOTE — ED CDU PROVIDER SUBSEQUENT DAY NOTE - HISTORY
WOLF Mendez: Pt c/o hot flashes, but vital remain stable during the episodes. Pt anxious appearing and tearful, will continue to monitor, awaiting TTE.

## 2023-05-11 NOTE — ED CDU PROVIDER INITIAL DAY NOTE - NS ED ROS FT
ROS:  GENERAL: As per HPI   HEENT: no vision changes, no trouble swallowing, no trouble speaking  CARDIAC: As per HPI   PULMONARY: As per HPI   GI: As per HPI   SKIN: no rashes  NEURO: no headache, no weakness, no dizziness  MSK: No joint pain  All other systems reviewed as negative.

## 2023-05-11 NOTE — ED CDU PROVIDER INITIAL DAY NOTE - OBJECTIVE STATEMENT
27-year-old female history of anemia, presents to ED complaining of weakness, palpitations, fatigue, left-sided intermittent chest pain worsening over the past few days.  Patient was seen in ED last night was given IV fluids initially was offered 1 unit of PRBCs however her hemoglobin was 8 and hematocrit 30 and patient felt better at the time of discharge.  She states when she woke up this morning started to have the same symptoms again.  No recent travel, leg swelling.  Patient is on OCPs.  Denies any shortness of breath, fever, cough, vaginal bleeding, abdominal pain, vomiting.    CDU WOLF Mendez: Agree with above. Pt c/o of episodes of "hot flashes" as well. Otherwise no other additional complaints. In ED patient found to have Hgb of 9, trop <6, D-dimer <150, and CXR WNL. Accepted to CDU for TTE given presumably new murmur.

## 2023-05-11 NOTE — ED CDU PROVIDER INITIAL DAY NOTE - CLINICAL SUMMARY MEDICAL DECISION MAKING FREE TEXT BOX
27-year-old female history of anemia, presents to ED complaining of weakness, palpitations, fatigue, left-sided intermittent chest pain worsening over the past few days. In ED patient found to have Hgb of 9, trop <6, D-dimer <150, and CXR WNL. Accepted to CDU for TTE and tele monitoring given presumably new murmur. Dispo pending.

## 2023-05-11 NOTE — ED CDU PROVIDER DISPOSITION NOTE - ATTENDING APP SHARED VISIT CONTRIBUTION OF CARE
27-year-old female history of anemia, seen in ER on 05/10/23 for weakness, palpitations, fatigue, left-sided intermittent chest pain worsening over the past few days. EKG, trop, and CXR normal. No acute event overnight in CDU. AM stress and echo normal. After 1 unit of RBC pt sxs resolved. Pt with no urinary sxs so UA was not f/u. As w/u is normal and VSS pt will be discharged home and f/u as outpt. Pt already spoke to someone regarding heme appt in office. At time of discharge pt stable and feels well. IV removed.

## 2023-05-11 NOTE — ED ADULT NURSE REASSESSMENT NOTE - NS ED NURSE REASSESS COMMENT FT1
Break Coverage RN: Pt A&Ox4, ambulatory. Respirations equal and unlabored, pt resting in stretcher at this time, offers no complaints. PRBC infusing, pt denies any s/s of transfusion reaction. Safety maintained, bed in lowest position, side rails raised, call bell in reach.
Pt received from main ED, denies SOB, chest pain, palpitations. sinus rhythm in tele. call bell provided. Pt was oriented to room and unit. plan echo. will continue to monitor

## 2023-05-11 NOTE — ED CDU PROVIDER DISPOSITION NOTE - PATIENT PORTAL LINK FT
You can access the FollowMyHealth Patient Portal offered by [facility] by registering at the following website: [email or web address]. By joining [employer]’s FollowMyHealth portal, you will also be able to view your health information using other applications (apps) compatible with our system.

## 2023-05-11 NOTE — ED CDU PROVIDER SUBSEQUENT DAY NOTE - ATTENDING APP SHARED VISIT CONTRIBUTION OF CARE
27-year-old female history of anemia, presents to ED complaining of weakness, palpitations, fatigue, left-sided intermittent chest pain worsening over the past few days. In ED patient found to have Hgb of 9, trop <6, D-dimer <150, and CXR WNL. Accepted to CDU for TTE and tele monitoring given presumably new murmur. Dispo pending.  alst noted to have hb of 7.6, second visit for weakness, will give prbc and heme abdono abbe

## 2023-05-11 NOTE — ED CDU PROVIDER INITIAL DAY NOTE - ATTENDING APP SHARED VISIT CONTRIBUTION OF CARE
Germain: I have seen and examined the patient face to face, have reviewed and addended the HPI, PE and a/p as necessary.    28 yo F with anemia, a/w weakness, palpitations, fatigue, left-sided intermittent chest pain worsening over the past few days.  Pt was seen yesterday and was given IVF and offered 1 unit PRBC however patient felt better after IVF and was discharged home.  Pt reports feeling now L sided chest pain.  Noted to be on OCPs.  Denies recent travel, leg swelling.  Denies any shortness of breath, fever, cough, vaginal bleeding, abdominal pain, vomiting.    GEN - NAD; well appearing; A+O x3; non-toxic appearing  CARD -s1s2, RRR, no M,G,R;   PULM - CTA b/l, symmetric breath sounds;   ABD -  +BS, ND, NT, soft, no guarding, no rebound, no masses;   BACK - no CVA tenderness, Normal  spine;   EXT - symmetric pulses, 2+ dp, capillary refill < 2 seconds, no cyanosis, no edema;   NEURO - no focal neuro deficits, no slurred speech    In ED, patient noted to have neg d-dimer and neg trop.  Chest xray wnl.  Placed obs for echo.

## 2023-05-11 NOTE — ED CDU PROVIDER DISPOSITION NOTE - CLINICAL COURSE
27-year-old female history of anemia, seen in ER on 05/10/23 for weakness, palpitations, fatigue, left-sided intermittent chest pain worsening over the past few days. EKG, trop, and CXR normal. No acute event overnight in CDU. AM stress and echo normal. As w/u is normal and VSS pt will be discharged home and f/u as outpt. 27-year-old female history of anemia, seen in ER on 05/10/23 for weakness, palpitations, fatigue, left-sided intermittent chest pain worsening over the past few days. EKG, trop, and CXR normal. No acute event overnight in CDU. AM stress and echo normal. After 1 unit of RBC pt sxs resolved. Pt with no urinary sxs so UA was not f/u. As w/u is normal and VSS pt will be discharged home and f/u as outpt. Pt already spoke to someone regarding heme appt in office. At time of discharge pt stable and feels well. IV removed.

## 2023-05-11 NOTE — ED CDU PROVIDER INITIAL DAY NOTE - PHYSICAL EXAMINATION
CONSTITUTIONAL: Well-appearing; well-nourished; in no apparent distress. Non-toxic appearing.   NEURO: Alert & oriented. Gait steady without assistance. Sensory and motor functions are grossly intact.  PSYCH: Mood appropriate. Thought processes intact.   NECK: Supple  CARD: Regular rate and rhythm, (+) systolic murmur.   RESP: No accessory muscle use; breath sounds clear and equal bilaterally; no wheezes, rhonchi, or rales     ABD: Soft; non-distended; non-tender. No guarding or rebound.   MUSCULOSKELETAL/EXTREMITIES: FROM in all four extremities; no extremity edema.  SKIN: Warm; dry; no apparent lesions or exudate

## 2023-05-13 LAB
CULTURE RESULTS: SIGNIFICANT CHANGE UP
SPECIMEN SOURCE: SIGNIFICANT CHANGE UP

## 2023-05-16 ENCOUNTER — OUTPATIENT (OUTPATIENT)
Dept: OUTPATIENT SERVICES | Facility: HOSPITAL | Age: 28
LOS: 1 days | Discharge: HOME | End: 2023-05-16

## 2023-05-16 DIAGNOSIS — D64.9 ANEMIA, UNSPECIFIED: ICD-10-CM

## 2023-05-18 ENCOUNTER — RESULT REVIEW (OUTPATIENT)
Age: 28
End: 2023-05-18

## 2023-05-18 ENCOUNTER — NON-APPOINTMENT (OUTPATIENT)
Age: 28
End: 2023-05-18

## 2023-05-18 ENCOUNTER — APPOINTMENT (OUTPATIENT)
Dept: HEMATOLOGY ONCOLOGY | Facility: CLINIC | Age: 28
End: 2023-05-18
Payer: MEDICAID

## 2023-05-18 VITALS
OXYGEN SATURATION: 99 % | TEMPERATURE: 97.3 F | SYSTOLIC BLOOD PRESSURE: 103 MMHG | WEIGHT: 228.69 LBS | HEART RATE: 82 BPM | BODY MASS INDEX: 38.1 KG/M2 | RESPIRATION RATE: 16 BRPM | DIASTOLIC BLOOD PRESSURE: 73 MMHG | HEIGHT: 65 IN

## 2023-05-18 DIAGNOSIS — D64.9 ANEMIA, UNSPECIFIED: ICD-10-CM

## 2023-05-18 LAB
ANISOCYTOSIS BLD QL: SLIGHT — SIGNIFICANT CHANGE UP
BASO STIPL BLD QL SMEAR: PRESENT — SIGNIFICANT CHANGE UP
BASOPHILS # BLD AUTO: 0.05 K/UL — SIGNIFICANT CHANGE UP (ref 0–0.2)
BASOPHILS NFR BLD AUTO: 0.8 % — SIGNIFICANT CHANGE UP (ref 0–2)
DACRYOCYTES BLD QL SMEAR: SLIGHT — SIGNIFICANT CHANGE UP
ELLIPTOCYTES BLD QL SMEAR: SLIGHT — SIGNIFICANT CHANGE UP
EOSINOPHIL # BLD AUTO: 0.13 K/UL — SIGNIFICANT CHANGE UP (ref 0–0.5)
EOSINOPHIL NFR BLD AUTO: 2.2 % — SIGNIFICANT CHANGE UP (ref 0–6)
HCT VFR BLD CALC: 35.4 % — SIGNIFICANT CHANGE UP (ref 34.5–45)
HGB BLD-MCNC: 9.8 G/DL — LOW (ref 11.5–15.5)
HYPOCHROMIA BLD QL: SLIGHT — SIGNIFICANT CHANGE UP
IMM GRANULOCYTES NFR BLD AUTO: 0.3 % — SIGNIFICANT CHANGE UP (ref 0–0.9)
LG PLATELETS BLD QL AUTO: SLIGHT — SIGNIFICANT CHANGE UP
LYMPHOCYTES # BLD AUTO: 1.5 K/UL — SIGNIFICANT CHANGE UP (ref 1–3.3)
LYMPHOCYTES # BLD AUTO: 25.5 % — SIGNIFICANT CHANGE UP (ref 13–44)
MCHC RBC-ENTMCNC: 18.1 PG — LOW (ref 27–34)
MCHC RBC-ENTMCNC: 27.7 G/DL — LOW (ref 32–36)
MCV RBC AUTO: 65.6 FL — LOW (ref 80–100)
MICROCYTES BLD QL: SLIGHT — SIGNIFICANT CHANGE UP
MONOCYTES # BLD AUTO: 0.36 K/UL — SIGNIFICANT CHANGE UP (ref 0–0.9)
MONOCYTES NFR BLD AUTO: 6.1 % — SIGNIFICANT CHANGE UP (ref 2–14)
NEUTROPHILS # BLD AUTO: 3.83 K/UL — SIGNIFICANT CHANGE UP (ref 1.8–7.4)
NEUTROPHILS NFR BLD AUTO: 65.1 % — SIGNIFICANT CHANGE UP (ref 43–77)
NRBC # BLD: 0 /100 WBCS — SIGNIFICANT CHANGE UP (ref 0–0)
PLAT MORPH BLD: ABNORMAL
PLATELET # BLD AUTO: 453 K/UL — HIGH (ref 150–400)
POIKILOCYTOSIS BLD QL AUTO: SLIGHT — SIGNIFICANT CHANGE UP
POLYCHROMASIA BLD QL SMEAR: SLIGHT — SIGNIFICANT CHANGE UP
RBC # BLD: 5.4 M/UL — HIGH (ref 3.8–5.2)
RBC # FLD: 21.3 % — HIGH (ref 10.3–14.5)
RBC BLD AUTO: ABNORMAL
RETICS #: 65.4 K/UL — SIGNIFICANT CHANGE UP (ref 25–125)
RETICS/RBC NFR: 1.2 % — SIGNIFICANT CHANGE UP (ref 0.5–2.5)
SCHISTOCYTES BLD QL AUTO: SLIGHT — SIGNIFICANT CHANGE UP
WBC # BLD: 5.89 K/UL — SIGNIFICANT CHANGE UP (ref 3.8–10.5)
WBC # FLD AUTO: 5.89 K/UL — SIGNIFICANT CHANGE UP (ref 3.8–10.5)

## 2023-05-18 PROCEDURE — 99244 OFF/OP CNSLTJ NEW/EST MOD 40: CPT

## 2023-05-19 LAB
ALBUMIN SERPL ELPH-MCNC: 4.7 G/DL
ALP BLD-CCNC: 101 U/L
ALT SERPL-CCNC: 16 U/L
ANION GAP SERPL CALC-SCNC: 14 MMOL/L
AST SERPL-CCNC: 17 U/L
BILIRUB SERPL-MCNC: 0.3 MG/DL
BUN SERPL-MCNC: 11 MG/DL
CALCIUM SERPL-MCNC: 9.9 MG/DL
CHLORIDE SERPL-SCNC: 105 MMOL/L
CO2 SERPL-SCNC: 22 MMOL/L
CREAT SERPL-MCNC: 0.5 MG/DL
EGFR: 133 ML/MIN/1.73M2
FERRITIN SERPL-MCNC: 12 NG/ML
FOLATE SERPL-MCNC: 9.4 NG/ML
GLUCOSE SERPL-MCNC: 94 MG/DL
HAPTOGLOB SERPL-MCNC: 271 MG/DL
IRON SATN MFR SERPL: 4 %
IRON SERPL-MCNC: 21 UG/DL
LDH SERPL-CCNC: 178 U/L
POTASSIUM SERPL-SCNC: 4.7 MMOL/L
PROT SERPL-MCNC: 7.6 G/DL
SODIUM SERPL-SCNC: 141 MMOL/L
TIBC SERPL-MCNC: 483 UG/DL
UIBC SERPL-MCNC: 462 UG/DL
VIT B12 SERPL-MCNC: 490 PG/ML

## 2023-05-19 RX ORDER — NIFEDIPINE 30 MG/1
30 TABLET, EXTENDED RELEASE ORAL DAILY
Refills: 0 | Status: COMPLETED | COMMUNITY
Start: 2022-06-02 | End: 2023-05-19

## 2023-05-19 RX ORDER — UBIDECARENONE/VIT E ACET 100MG-5
50 MCG CAPSULE ORAL
Qty: 30 | Refills: 2 | Status: COMPLETED | COMMUNITY
Start: 2021-11-09 | End: 2023-05-19

## 2023-05-19 RX ORDER — FERROUS SULFATE 220 (44)/5
220 (44 FE) SOLUTION, ORAL ORAL TWICE DAILY
Qty: 1 | Refills: 1 | Status: COMPLETED | COMMUNITY
Start: 2022-02-14 | End: 2023-05-19

## 2023-05-19 NOTE — ASSESSMENT
[FreeTextEntry1] : 27 yo F. presents for evaluation of anemia following ER visit  on 5/10 with 3-4 day history of weakness, left sided intermittent CP. Hgb was 9.2, and 7.6 on repeat. She received 1 unit of PRBC, cardiac evaluation was negative. Per EMR the patient has been anemic all of 2022 with hgb ranging from ~ 7 to 9 g/dL. She was pregnant and delivered on 5/23/22.  Reports receiving 2 units of PRBC in 8/2022, period was heavy at the time, bled for 1 month. periods are now light on birth control. She is taking Ferrous Sulfate 1 tab daily without issues.\par \par Plan\par --CBC, retic, iron studies to evaluate response to oral iron \par --Alpha globin gene mutation sent to r/o thalassemia\par --Vitamin B12 and Folate sent to r/o other nutritional deficiencies contributing to the anemia\par --Haptoglobin and LDH sent to r/o hemolysis\par --Patient to continue oral iron, advise to take it with OTC vitamin C or orange juice for increased absorption. Advised iron rich diet and reviewed foods.\par --Consider GI referral\par \par \par Addendum:\par lab results reviewed with patient, c/w iron deficiency anemia. Her hgb improved and is 9.8g/dL. Advised to continue the oral iron and follow up in 1 month.

## 2023-05-19 NOTE — HISTORY OF PRESENT ILLNESS
[de-identified] : Kenyatta Selby is a 29 yo F. with h/o anemia. Seen in consultation following ER visit  on 5/10 with 3-4 day history of weakness, left sided intermittent CP. Hgb was 9.2, and 7.6 on repeat. She received 1 unit of PRBC, cardiac evaluation was negative. Per EMR the patient has been anemic all of  with hgb ranging from ~ 7 to 9 g/dL. She was pregnant and delivered on 22.  Reports receiving 2 units of PRBC in 2022, period was heavy at the time, bled for 1 month. More recently has been taking the OCP Jencycla, periods are now monthly and very light. Since the ER visit she has been taking Ferrous Sulfate 1 tab per day without adverse effects. Patient denies bleeding to other sites. Denies known hemoglobinopathies. Hemoglobin electrophoresis done in  was negative for beta thalassemia. Notes no unexplained fevers, chills, night sweats, recurrent infections and skeletal pain. Feels well today, with exertional SOB and palpitations.\par \par PMH: Anemia\par PSH: C section \par Hospitalization: per above\par OB/GYN:  1 para 1\par Medication: see medication reconciliation\par Allergies: NKDA\par Social hx:  has a 12 month old. Denies alcohol and drug use. Born in the [place] descent.\par Family hx: non contributory\par Healthcare maintenance\par PCP: Dr. Adela Benson .. due for visit\par GYN: last exam 2022\par

## 2023-05-31 LAB — ALPHA - GLOBIN COMMON MUTATION RESULT: NORMAL

## 2023-06-27 ENCOUNTER — APPOINTMENT (OUTPATIENT)
Dept: HEMATOLOGY ONCOLOGY | Facility: CLINIC | Age: 28
End: 2023-06-27

## 2023-07-03 RX ORDER — NORETHINDRONE 0.35 MG/1
0.35 TABLET ORAL
Qty: 1 | Refills: 1 | Status: ACTIVE | COMMUNITY
Start: 2022-08-12

## 2023-07-05 ENCOUNTER — RX RENEWAL (OUTPATIENT)
Age: 28
End: 2023-07-05

## 2023-07-14 ENCOUNTER — OUTPATIENT (OUTPATIENT)
Dept: OUTPATIENT SERVICES | Facility: HOSPITAL | Age: 28
LOS: 1 days | Discharge: HOME | End: 2023-07-14

## 2023-07-14 DIAGNOSIS — D64.9 ANEMIA, UNSPECIFIED: ICD-10-CM

## 2023-07-17 PROBLEM — D64.9 ANEMIA: Status: ACTIVE | Noted: 2023-05-18

## 2023-07-21 ENCOUNTER — APPOINTMENT (OUTPATIENT)
Dept: HEMATOLOGY ONCOLOGY | Facility: CLINIC | Age: 28
End: 2023-07-21

## 2023-08-16 ENCOUNTER — OUTPATIENT (OUTPATIENT)
Dept: OUTPATIENT SERVICES | Facility: HOSPITAL | Age: 28
LOS: 1 days | End: 2023-08-16

## 2023-08-16 ENCOUNTER — APPOINTMENT (OUTPATIENT)
Dept: OBGYN | Facility: HOSPITAL | Age: 28
End: 2023-08-16
Payer: MEDICAID

## 2023-08-16 VITALS
WEIGHT: 240 LBS | TEMPERATURE: 97.8 F | HEIGHT: 65 IN | SYSTOLIC BLOOD PRESSURE: 139 MMHG | BODY MASS INDEX: 39.99 KG/M2 | DIASTOLIC BLOOD PRESSURE: 74 MMHG | HEART RATE: 87 BPM

## 2023-08-16 DIAGNOSIS — Z86.32 PERSONAL HISTORY OF GESTATIONAL DIABETES: ICD-10-CM

## 2023-08-16 DIAGNOSIS — Z34.83 ENCOUNTER FOR SUPERVISION OF OTHER NORMAL PREGNANCY, THIRD TRIMESTER: ICD-10-CM

## 2023-08-16 DIAGNOSIS — Z87.59 PERSONAL HISTORY OF OTHER COMPLICATIONS OF PREGNANCY, CHILDBIRTH AND THE PUERPERIUM: ICD-10-CM

## 2023-08-16 DIAGNOSIS — Z67.91 UNSPECIFIED BLOOD TYPE, RH NEGATIVE: ICD-10-CM

## 2023-08-16 DIAGNOSIS — O12.10 GESTATIONAL PROTEINURIA, UNSPECIFIED TRIMESTER: ICD-10-CM

## 2023-08-16 DIAGNOSIS — O99.019 ANEMIA COMPLICATING PREGNANCY, UNSPECIFIED TRIMESTER: ICD-10-CM

## 2023-08-16 PROCEDURE — 99213 OFFICE O/P EST LOW 20 MIN: CPT | Mod: GC

## 2023-08-16 RX ORDER — NORETHINDRONE 0.35 MG/1
0.35 TABLET ORAL
Qty: 1 | Refills: 0 | Status: ACTIVE | COMMUNITY
Start: 2023-05-31

## 2023-08-17 DIAGNOSIS — Z30.9 ENCOUNTER FOR CONTRACEPTIVE MANAGEMENT, UNSPECIFIED: ICD-10-CM

## 2023-08-17 NOTE — REVIEW OF SYSTEMS
[Negative] : Cardiovascular [Abdominal Pain] : no abdominal pain [Abn Vaginal bleeding] : no abnormal vaginal bleeding

## 2023-08-17 NOTE — PHYSICAL EXAM
[Soft] : soft [Non-tender] : non-tender [Non-distended] : non-distended [FreeTextEntry1] : Patient declined physical examination at this visit.

## 2023-08-17 NOTE — HISTORY OF PRESENT ILLNESS
[Patient reported PAP Smear was normal] : Patient reported PAP Smear was normal [FreeTextEntry1] : Pt is 27yo  here for refill of her birth control. She was prescribed minipill Aug/2022 since her delivery and has run out twice. Pt states she had to go to urgent care twice for refill. Pt satisfied with this method of contraception, no abdominal pain, abnormal bleeding. Pt has a history of anemia and states the minipill has improved her heavy vaginal bleeding. She follows w/ heme-onc for further workup of anemia. She denies any dizziness, lightheadedness, CP/SOB.     Pt reports last pap smear was done in  during her pregnancy and that it was normal. No record retrieved, but pt declines a pap smear today. Pt was counseled on need for annual visit, offered to do full examination today but patient declined at this time and states she will RTC in 1 mo for her annual visit. Pt was also counseled on other options for BC, prefers to continue on the mini pill.   Menstrual history: 28 days cycle, lasting 6~7 days, moderate flow OB: C/S due to preeclampsia with severe feature. gyn: denies history of fibroid, cyst, STD or abnormal pap. Last pap done 1~2 years ago, recalls it was normal.  PMH: Anemia blood transfusion x2  PSH: C/S medication: iron supplement

## 2023-08-17 NOTE — DISCUSSION/SUMMARY
[FreeTextEntry1] : 30 yo  here for refill of minipill for contraception and control of heavy menses. Pt was counseled on need for annual visit, offered to do full examination today but patient declined at this time and states she will RTC in 1 mo for her annual visit.   #Contraception  - Pt was also counseled on other options for BC, prefers to continue on the mini pill.  - levonorgestrel minipill x1mo sent to pharmacy - RTC in 1 month for annual exam and years supply of BC  D/w Dr. Benjie Sadler PGY-1

## 2023-09-21 ENCOUNTER — APPOINTMENT (OUTPATIENT)
Dept: OBGYN | Facility: HOSPITAL | Age: 28
End: 2023-09-21

## 2023-10-03 ENCOUNTER — APPOINTMENT (OUTPATIENT)
Dept: OBGYN | Facility: HOSPITAL | Age: 28
End: 2023-10-03

## 2023-10-11 ENCOUNTER — APPOINTMENT (OUTPATIENT)
Dept: OBGYN | Facility: HOSPITAL | Age: 28
End: 2023-10-11

## 2023-10-31 NOTE — ED CDU PROVIDER INITIAL DAY NOTE - NSICDXFAMILYHX_GEN_ALL_CORE_FT
Occupational Therapy Treatment/Reassessment Note and updated POC     Date: 10/31/2023  Name: Laine Smith  Clinic Number: [id number]    demonstration in previous sessions        Laine GERARDO Syed           FAMILY HISTORY:  No pertinent family history in first degree relatives

## 2024-03-16 ENCOUNTER — EMERGENCY (EMERGENCY)
Facility: HOSPITAL | Age: 29
LOS: 1 days | Discharge: HOME | End: 2024-03-16
Attending: EMERGENCY MEDICINE | Admitting: EMERGENCY MEDICINE
Payer: MEDICAID

## 2024-03-16 VITALS
OXYGEN SATURATION: 100 % | RESPIRATION RATE: 16 BRPM | DIASTOLIC BLOOD PRESSURE: 67 MMHG | SYSTOLIC BLOOD PRESSURE: 116 MMHG | HEART RATE: 75 BPM | TEMPERATURE: 99 F

## 2024-03-16 VITALS
RESPIRATION RATE: 16 BRPM | DIASTOLIC BLOOD PRESSURE: 95 MMHG | TEMPERATURE: 98 F | HEART RATE: 85 BPM | SYSTOLIC BLOOD PRESSURE: 150 MMHG | OXYGEN SATURATION: 100 %

## 2024-03-16 LAB
ALBUMIN SERPL ELPH-MCNC: 4.6 G/DL — SIGNIFICANT CHANGE UP (ref 3.3–5)
ALP SERPL-CCNC: 78 U/L — SIGNIFICANT CHANGE UP (ref 40–120)
ALT FLD-CCNC: 15 U/L — SIGNIFICANT CHANGE UP (ref 4–33)
ANION GAP SERPL CALC-SCNC: 12 MMOL/L — SIGNIFICANT CHANGE UP (ref 7–14)
APTT BLD: 31.9 SEC — SIGNIFICANT CHANGE UP (ref 24.5–35.6)
AST SERPL-CCNC: 17 U/L — SIGNIFICANT CHANGE UP (ref 4–32)
BASOPHILS # BLD AUTO: 0.07 K/UL — SIGNIFICANT CHANGE UP (ref 0–0.2)
BASOPHILS NFR BLD AUTO: 0.8 % — SIGNIFICANT CHANGE UP (ref 0–2)
BILIRUB SERPL-MCNC: 0.2 MG/DL — SIGNIFICANT CHANGE UP (ref 0.2–1.2)
BLD GP AB SCN SERPL QL: NEGATIVE — SIGNIFICANT CHANGE UP
BUN SERPL-MCNC: 8 MG/DL — SIGNIFICANT CHANGE UP (ref 7–23)
CALCIUM SERPL-MCNC: 9.8 MG/DL — SIGNIFICANT CHANGE UP (ref 8.4–10.5)
CHLORIDE SERPL-SCNC: 102 MMOL/L — SIGNIFICANT CHANGE UP (ref 98–107)
CO2 SERPL-SCNC: 25 MMOL/L — SIGNIFICANT CHANGE UP (ref 22–31)
CREAT SERPL-MCNC: 0.54 MG/DL — SIGNIFICANT CHANGE UP (ref 0.5–1.3)
EGFR: 130 ML/MIN/1.73M2 — SIGNIFICANT CHANGE UP
EOSINOPHIL # BLD AUTO: 0.14 K/UL — SIGNIFICANT CHANGE UP (ref 0–0.5)
EOSINOPHIL NFR BLD AUTO: 1.6 % — SIGNIFICANT CHANGE UP (ref 0–6)
GLUCOSE SERPL-MCNC: 98 MG/DL — SIGNIFICANT CHANGE UP (ref 70–99)
HCG SERPL-ACNC: <1 MIU/ML — SIGNIFICANT CHANGE UP
HCT VFR BLD CALC: 37 % — SIGNIFICANT CHANGE UP (ref 34.5–45)
HGB BLD-MCNC: 11.4 G/DL — LOW (ref 11.5–15.5)
IANC: 5.24 K/UL — SIGNIFICANT CHANGE UP (ref 1.8–7.4)
IMM GRANULOCYTES NFR BLD AUTO: 0.2 % — SIGNIFICANT CHANGE UP (ref 0–0.9)
INR BLD: 0.9 RATIO — SIGNIFICANT CHANGE UP (ref 0.85–1.18)
LYMPHOCYTES # BLD AUTO: 2.58 K/UL — SIGNIFICANT CHANGE UP (ref 1–3.3)
LYMPHOCYTES # BLD AUTO: 29.9 % — SIGNIFICANT CHANGE UP (ref 13–44)
MCHC RBC-ENTMCNC: 22.6 PG — LOW (ref 27–34)
MCHC RBC-ENTMCNC: 30.8 GM/DL — LOW (ref 32–36)
MCV RBC AUTO: 73.3 FL — LOW (ref 80–100)
MONOCYTES # BLD AUTO: 0.57 K/UL — SIGNIFICANT CHANGE UP (ref 0–0.9)
MONOCYTES NFR BLD AUTO: 6.6 % — SIGNIFICANT CHANGE UP (ref 2–14)
NEUTROPHILS # BLD AUTO: 5.24 K/UL — SIGNIFICANT CHANGE UP (ref 1.8–7.4)
NEUTROPHILS NFR BLD AUTO: 60.9 % — SIGNIFICANT CHANGE UP (ref 43–77)
NRBC # BLD: 0 /100 WBCS — SIGNIFICANT CHANGE UP (ref 0–0)
NRBC # FLD: 0 K/UL — SIGNIFICANT CHANGE UP (ref 0–0)
PLATELET # BLD AUTO: 417 K/UL — HIGH (ref 150–400)
POTASSIUM SERPL-MCNC: 3.9 MMOL/L — SIGNIFICANT CHANGE UP (ref 3.5–5.3)
POTASSIUM SERPL-SCNC: 3.9 MMOL/L — SIGNIFICANT CHANGE UP (ref 3.5–5.3)
PROT SERPL-MCNC: 8.1 G/DL — SIGNIFICANT CHANGE UP (ref 6–8.3)
PROTHROM AB SERPL-ACNC: 10.2 SEC — SIGNIFICANT CHANGE UP (ref 9.5–13)
RBC # BLD: 5.05 M/UL — SIGNIFICANT CHANGE UP (ref 3.8–5.2)
RBC # FLD: 15.9 % — HIGH (ref 10.3–14.5)
RH IG SCN BLD-IMP: NEGATIVE — SIGNIFICANT CHANGE UP
SODIUM SERPL-SCNC: 139 MMOL/L — SIGNIFICANT CHANGE UP (ref 135–145)
WBC # BLD: 8.62 K/UL — SIGNIFICANT CHANGE UP (ref 3.8–10.5)
WBC # FLD AUTO: 8.62 K/UL — SIGNIFICANT CHANGE UP (ref 3.8–10.5)

## 2024-03-16 PROCEDURE — 93010 ELECTROCARDIOGRAM REPORT: CPT

## 2024-03-16 PROCEDURE — 36000 PLACE NEEDLE IN VEIN: CPT

## 2024-03-16 PROCEDURE — 99284 EMERGENCY DEPT VISIT MOD MDM: CPT | Mod: 25

## 2024-03-16 RX ORDER — SODIUM CHLORIDE 9 MG/ML
1000 INJECTION INTRAMUSCULAR; INTRAVENOUS; SUBCUTANEOUS ONCE
Refills: 0 | Status: COMPLETED | OUTPATIENT
Start: 2024-03-16 | End: 2024-03-16

## 2024-03-16 RX ADMIN — SODIUM CHLORIDE 1000 MILLILITER(S): 9 INJECTION INTRAMUSCULAR; INTRAVENOUS; SUBCUTANEOUS at 05:33

## 2024-03-16 NOTE — ED PROCEDURE NOTE - ATTENDING CONTRIBUTION TO CARE
MD MYERS:  I was present for the critical portions of this procedure and provided direct attending supervision.

## 2024-03-16 NOTE — ED PROVIDER NOTE - NSFOLLOWUPINSTRUCTIONS_ED_ALL_ED_FT
- Your testing/exams was/were reassuring that dangerous emergencies/conditions are less likely to be occurring or to have occurred.    - Take all medications, if given/sent to pharmacy, and as, directed.    - If you had labs or imaging done, you were given copies of all labs and/or imaging results from your er visit--please take them with you to your follow up appointments.  - If needed, call patient access services at [phone] to find a primary care physician (PCP). Call this number to follow up with a specialty service, such as the spine clinic. If you need this, call and say you were recently in the emergency department and you are calling, per my orders.   - Make sure you do not require a primary care physician's referral if you make a specialty clinic appointment directly. Some insurance requires you to see your PCP, get a referral, then make a specialty appointment.

## 2024-03-16 NOTE — ED PROVIDER NOTE - PATIENT PORTAL LINK FT
You can access the FollowMyHealth Patient Portal offered by [facility] by registering at the following website: [email or web address]. By joining [facility]’s FollowMyHealth portal, you will also be able to view your health information using other applications (apps) compatible with our system.

## 2024-03-16 NOTE — ED ADULT TRIAGE NOTE - CHIEF COMPLAINT QUOTE
Pt c/o weakness, lightheadedness, palpitations x 3 days. Pt has hx of anemia  and states it feels similar to last time she need a transfusion.

## 2024-03-16 NOTE — ED PROVIDER NOTE - CLINICAL SUMMARY MEDICAL DECISION MAKING FREE TEXT BOX
MDM/Summary/DDx (includes but is not limited to):   Is a 28-year-old female with a history of heavy menses requiring transfusions and iron in the past coming in with weakness and palpitations.  Last menstrual period approximately 2 weeks ago.  Vital signs reviewed.  Not in shock state.  Not meeting SIRS criteria.  could be symptomatic anemia versus electrolyte abnormalities versus arrhythmia.  Will plan for CBC CMP PT PTT type and screen hCG, no imaging required at this time.  Benign abdomen.  Exam and history is not consistent with intra-abdominal pathology.  Will plan for transfusion, likely back to her OB/GYN for possible birth control in the future.  Discussed the use of her iron supplementation, should do it daily or as prescribed.    Triage note reviewed. VS reviewed. EKG reviewed and documented in "RESULTS" section, if possible at given time.     DDx in MDM includes the most likely ddx, but is not limited to solely what is listed. Clinical course may alter/deviate from the above plan. When possible, progress notes written, as needed, and are included in "PROGRESS NOTE" section below.       Medical, family, and social determinants of health reviewed and discussed w/ pt/family/caretaker, when allowable, and is incorporated into note above, whenever possible.

## 2024-03-16 NOTE — ED PROVIDER NOTE - ATTENDING CONTRIBUTION TO CARE
DR. MYERS, ATTENDING MD-  I performed a face to face bedside interview with the patient regarding history of present illness, review of symptoms and past medical history. I completed an independent physical exam.  I have discussed the patient's plan of care with the resident.   Documentation as above in the note.    29 y/o female h/o anemia on iron here with c/o fatigue lightheadedness x3 days.  Evaluate for anemia lyte abn dehydration pregnancy arrhythmia.  Obtain ekg cbc cmp hcg give ivf bolus reassess.

## 2024-03-16 NOTE — ED PROVIDER NOTE - OBJECTIVE STATEMENT
HPI & ROS: 28-year-old female with a history of gestational diabetes, heavy menses requiring transfusions in the past, requiring iron, coming in with weakness.   Last menstrual period approximately 2 weeks ago.  Heavy in nature.  Going through multiple pads an hour.  Not completely saturated.  Patient was on OCPs in the past, did not like how she felt and discontinued these w her obgyn.  patient for the past 2 to 3 days has been feeling weak with the intermittent palpitations described as her chest beating fast, no chest pain or discomfort.  No shortness of breath.  Feels more pale than normal.  Patient without fevers.  No chills.  No nausea no vomiting.  No abdominal pain.  Normal bowel movements.  No dysuria.

## 2024-03-16 NOTE — ED ADULT NURSE NOTE - OBJECTIVE STATEMENT
Received the patient in spot 26A with c/o weakness, lightheadedness, palpitations x 3 days. Pt has hx of anemia  and states it feels similar when she needed blood transfusion in the past. not in acute distress. alert and orientedx 4, ambulating independently. Safety maintained. attempted IV line placement. Patient needs USIV. MD Lemos made aware.  Nursing  care is ongoing.

## 2024-03-16 NOTE — ED PROCEDURE NOTE - PROCEDURE ADDITIONAL DETAILS
20 basilic L side    Above line placed under dynamic guidance using ultrasound.   Dark, nonpulsatile blood return noted, flushed afterwards without any resistance or resulting extravasation.   Flushed with three 10 mL flushes, each with good blood return through J-loop.

## 2024-03-16 NOTE — ED ADULT NURSE NOTE - NSFALLUNIVINTERV_ED_ALL_ED
Bed/Stretcher in lowest position, wheels locked, appropriate side rails in place/Call bell, personal items and telephone in reach/Instruct patient to call for assistance before getting out of bed/chair/stretcher/Non-slip footwear applied when patient is off stretcher/[facility] to call system/Physically safe environment - no spills, clutter or unnecessary equipment/Purposeful proactive rounding/Room/bathroom lighting operational, light cord in reach

## 2024-03-16 NOTE — ED ADULT NURSE REASSESSMENT NOTE - NS ED NURSE REASSESS COMMENT FT1
Patient resting on stretcher. denies any pain, not in acute distress. Safety maintained. IV line removed. Patient for D/C

## 2024-04-14 NOTE — ED PROVIDER NOTE - WR ORDER ID 1
Goal Outcome Evaluation:  Plan of Care Reviewed With: patient, family           Outcome Evaluation: PT IS LIKELY CLOSE TO RECENT BASELINE WITH FUNCTIONAL MOBILITY AND MENTAL STATUS. COMPLETED BED MOBILITY WITH SBA AND TRANSFERS/GAIT WITH R WALKER AND CGA BUT NO OVERT LOB. PT IS APHASIC BUT FOLLOWS ALL COMMANDS. PT HAS 24/7 CAREGIVERS AT HOME. RECOMMEND PT RESUME  OP THERAPY AT D/C.INPT P.T. IS SIGNING OFF.       Anticipated Discharge Disposition (PT): home with assist, home with 24/7 care                         [id number]

## 2024-11-11 NOTE — ED ADULT NURSE NOTE - NS PRO AD PATIENT TYPE
Called and left a voicemail for the patient to set up a follow up appointment for MD/labs within the next month. Endorsed clinic's phone number for callback to schedule.   
Health Care Proxy (HCP)

## 2024-12-25 PROBLEM — F10.90 ALCOHOL USE: Status: INACTIVE | Noted: 2021-11-03

## 2025-03-08 ENCOUNTER — EMERGENCY (EMERGENCY)
Facility: HOSPITAL | Age: 30
LOS: 1 days | Discharge: HOME | End: 2025-03-08
Admitting: EMERGENCY MEDICINE
Payer: MEDICAID

## 2025-03-08 VITALS
HEIGHT: 65 IN | HEART RATE: 76 BPM | SYSTOLIC BLOOD PRESSURE: 157 MMHG | WEIGHT: 250 LBS | OXYGEN SATURATION: 100 % | TEMPERATURE: 98 F | DIASTOLIC BLOOD PRESSURE: 98 MMHG | RESPIRATION RATE: 16 BRPM

## 2025-03-08 PROCEDURE — 99284 EMERGENCY DEPT VISIT MOD MDM: CPT

## 2025-03-08 PROCEDURE — 93010 ELECTROCARDIOGRAM REPORT: CPT

## 2025-03-08 NOTE — ED ADULT TRIAGE NOTE - CHIEF COMPLAINT QUOTE
pt c/o chest pain, palpitations and dizziness x3 days. LMP Feb. no past medical hx. pt well appearing. awaiting ekg.

## 2025-03-09 VITALS
SYSTOLIC BLOOD PRESSURE: 158 MMHG | TEMPERATURE: 98 F | HEART RATE: 73 BPM | DIASTOLIC BLOOD PRESSURE: 90 MMHG | OXYGEN SATURATION: 98 % | RESPIRATION RATE: 16 BRPM

## 2025-03-09 LAB
ALBUMIN SERPL ELPH-MCNC: 4.6 G/DL — SIGNIFICANT CHANGE UP (ref 3.3–5)
ALP SERPL-CCNC: 77 U/L — SIGNIFICANT CHANGE UP (ref 40–120)
ALT FLD-CCNC: 15 U/L — SIGNIFICANT CHANGE UP (ref 4–33)
ANION GAP SERPL CALC-SCNC: 15 MMOL/L — HIGH (ref 7–14)
APPEARANCE UR: CLEAR — SIGNIFICANT CHANGE UP
AST SERPL-CCNC: 16 U/L — SIGNIFICANT CHANGE UP (ref 4–32)
BASOPHILS # BLD AUTO: 0.04 K/UL — SIGNIFICANT CHANGE UP (ref 0–0.2)
BASOPHILS NFR BLD AUTO: 0.5 % — SIGNIFICANT CHANGE UP (ref 0–2)
BILIRUB SERPL-MCNC: 0.2 MG/DL — SIGNIFICANT CHANGE UP (ref 0.2–1.2)
BILIRUB UR-MCNC: NEGATIVE — SIGNIFICANT CHANGE UP
BUN SERPL-MCNC: 7 MG/DL — SIGNIFICANT CHANGE UP (ref 7–23)
CALCIUM SERPL-MCNC: 10.1 MG/DL — SIGNIFICANT CHANGE UP (ref 8.4–10.5)
CHLORIDE SERPL-SCNC: 102 MMOL/L — SIGNIFICANT CHANGE UP (ref 98–107)
CO2 SERPL-SCNC: 22 MMOL/L — SIGNIFICANT CHANGE UP (ref 22–31)
COLOR SPEC: YELLOW — SIGNIFICANT CHANGE UP
CREAT SERPL-MCNC: 0.53 MG/DL — SIGNIFICANT CHANGE UP (ref 0.5–1.3)
DIFF PNL FLD: NEGATIVE — SIGNIFICANT CHANGE UP
EGFR: 130 ML/MIN/1.73M2 — SIGNIFICANT CHANGE UP
EOSINOPHIL # BLD AUTO: 0.09 K/UL — SIGNIFICANT CHANGE UP (ref 0–0.5)
EOSINOPHIL NFR BLD AUTO: 1.1 % — SIGNIFICANT CHANGE UP (ref 0–6)
GLUCOSE SERPL-MCNC: 95 MG/DL — SIGNIFICANT CHANGE UP (ref 70–99)
GLUCOSE UR QL: NEGATIVE MG/DL — SIGNIFICANT CHANGE UP
HCT VFR BLD CALC: 40.5 % — SIGNIFICANT CHANGE UP (ref 34.5–45)
HGB BLD-MCNC: 12.2 G/DL — SIGNIFICANT CHANGE UP (ref 11.5–15.5)
IANC: 6.1 K/UL — SIGNIFICANT CHANGE UP (ref 1.8–7.4)
IMM GRANULOCYTES NFR BLD AUTO: 0.4 % — SIGNIFICANT CHANGE UP (ref 0–0.9)
KETONES UR-MCNC: NEGATIVE MG/DL — SIGNIFICANT CHANGE UP
LEUKOCYTE ESTERASE UR-ACNC: NEGATIVE — SIGNIFICANT CHANGE UP
LYMPHOCYTES # BLD AUTO: 1.71 K/UL — SIGNIFICANT CHANGE UP (ref 1–3.3)
LYMPHOCYTES # BLD AUTO: 20.2 % — SIGNIFICANT CHANGE UP (ref 13–44)
MCHC RBC-ENTMCNC: 21.9 PG — LOW (ref 27–34)
MCHC RBC-ENTMCNC: 30.1 G/DL — LOW (ref 32–36)
MCV RBC AUTO: 72.6 FL — LOW (ref 80–100)
MONOCYTES # BLD AUTO: 0.49 K/UL — SIGNIFICANT CHANGE UP (ref 0–0.9)
MONOCYTES NFR BLD AUTO: 5.8 % — SIGNIFICANT CHANGE UP (ref 2–14)
NEUTROPHILS # BLD AUTO: 6.1 K/UL — SIGNIFICANT CHANGE UP (ref 1.8–7.4)
NEUTROPHILS NFR BLD AUTO: 72 % — SIGNIFICANT CHANGE UP (ref 43–77)
NITRITE UR-MCNC: NEGATIVE — SIGNIFICANT CHANGE UP
NRBC # BLD AUTO: 0 K/UL — SIGNIFICANT CHANGE UP (ref 0–0)
NRBC # FLD: 0 K/UL — SIGNIFICANT CHANGE UP (ref 0–0)
NRBC BLD AUTO-RTO: 0 /100 WBCS — SIGNIFICANT CHANGE UP (ref 0–0)
PH UR: 6.5 — SIGNIFICANT CHANGE UP (ref 5–8)
PLATELET # BLD AUTO: 413 K/UL — HIGH (ref 150–400)
POTASSIUM SERPL-MCNC: 3.7 MMOL/L — SIGNIFICANT CHANGE UP (ref 3.5–5.3)
POTASSIUM SERPL-SCNC: 3.7 MMOL/L — SIGNIFICANT CHANGE UP (ref 3.5–5.3)
PROT SERPL-MCNC: 8.2 G/DL — SIGNIFICANT CHANGE UP (ref 6–8.3)
PROT UR-MCNC: NEGATIVE MG/DL — SIGNIFICANT CHANGE UP
RBC # BLD: 5.58 M/UL — HIGH (ref 3.8–5.2)
RBC # FLD: 16.1 % — HIGH (ref 10.3–14.5)
SODIUM SERPL-SCNC: 139 MMOL/L — SIGNIFICANT CHANGE UP (ref 135–145)
SP GR SPEC: 1.01 — SIGNIFICANT CHANGE UP (ref 1–1.03)
TSH SERPL-MCNC: 2.03 UIU/ML — SIGNIFICANT CHANGE UP (ref 0.27–4.2)
UROBILINOGEN FLD QL: 0.2 MG/DL — SIGNIFICANT CHANGE UP (ref 0.2–1)
WBC # BLD: 8.46 K/UL — SIGNIFICANT CHANGE UP (ref 3.8–10.5)
WBC # FLD AUTO: 8.46 K/UL — SIGNIFICANT CHANGE UP (ref 3.8–10.5)

## 2025-03-09 PROCEDURE — 71046 X-RAY EXAM CHEST 2 VIEWS: CPT | Mod: 26

## 2025-03-09 RX ADMIN — Medication 1000 MILLILITER(S): at 00:56

## 2025-03-09 NOTE — ED PROVIDER NOTE - NSFOLLOWUPINSTRUCTIONS_ED_ALL_ED_FT
Drink plenty of fluids.  Follow up with your PCP in 2 days.  Return to ED for any worsening palpitations, shortness of breath, chest pain or any other concerning symptoms.

## 2025-03-09 NOTE — ED PROVIDER NOTE - PATIENT PORTAL LINK FT
You can access the FollowMyHealth Patient Portal offered by [facility] by registering at the following website: [email or web address]. By joining [email or web address]’s FollowMyHealth portal, you will also be able to view your health information using other applications (apps) compatible with our system.

## 2025-03-09 NOTE — ED PROVIDER NOTE - CLINICAL SUMMARY MEDICAL DECISION MAKING FREE TEXT BOX
29-year-old female history of preeclampsia presents ED complaining of generalized weakness, palpitations, intermittent chest discomfort x 3 days.  Patient states has been fasting due to her vomiting and also has not been sleeping well at night because son has been sick states thinks that she is dehydrated.  Denies any fevers, pleuritic CP, SOB, abdominal pain, nausea, vomiting, diarrhea, leg swelling, headache.  r/o anemia, electrolyte dysfunction, likely dehydrated, check labs, give fluids, ekg, cxr, unlikely PE- PERC neg

## 2025-03-09 NOTE — ED PROVIDER NOTE - OBJECTIVE STATEMENT
29-year-old female history of preeclampsia presents ED complaining of generalized weakness, palpitations, intermittent chest discomfort x x 3 days.  Patient states has been fasting due to her vomiting and also has not been sleeping well at night because son has been sick states thinks that she is dehydrated.  Denies any fevers, pleuritic CP, SOB, abdominal pain, nausea, vomiting, diarrhea, leg swelling, headache.

## 2025-03-09 NOTE — ED ADULT NURSE NOTE - OBJECTIVE STATEMENT
Received pt in intake room 13 with chest pain , palpitations, dizziness and elevated Blood Pressure for 3 days. Patient alert and oriented x3 . Pain tolerable at this time. patient stated that she was fasting for Ramadan and wasn't drinking or eating well.